# Patient Record
Sex: MALE | Race: WHITE | NOT HISPANIC OR LATINO
[De-identification: names, ages, dates, MRNs, and addresses within clinical notes are randomized per-mention and may not be internally consistent; named-entity substitution may affect disease eponyms.]

---

## 2017-02-21 ENCOUNTER — TRANSCRIPTION ENCOUNTER (OUTPATIENT)
Age: 77
End: 2017-02-21

## 2017-07-10 ENCOUNTER — APPOINTMENT (OUTPATIENT)
Dept: ORTHOPEDIC SURGERY | Facility: CLINIC | Age: 77
End: 2017-07-10

## 2017-07-10 VITALS — SYSTOLIC BLOOD PRESSURE: 145 MMHG | HEART RATE: 80 BPM | DIASTOLIC BLOOD PRESSURE: 77 MMHG

## 2017-07-10 VITALS — HEIGHT: 72 IN | WEIGHT: 232 LBS | BODY MASS INDEX: 31.42 KG/M2

## 2017-07-10 DIAGNOSIS — Z78.9 OTHER SPECIFIED HEALTH STATUS: ICD-10-CM

## 2017-07-10 DIAGNOSIS — Z86.39 PERSONAL HISTORY OF OTHER ENDOCRINE, NUTRITIONAL AND METABOLIC DISEASE: ICD-10-CM

## 2017-07-10 DIAGNOSIS — Z60.2 PROBLEMS RELATED TO LIVING ALONE: ICD-10-CM

## 2017-07-10 DIAGNOSIS — M25.531 PAIN IN RIGHT WRIST: ICD-10-CM

## 2017-07-10 DIAGNOSIS — M25.532 PAIN IN RIGHT WRIST: ICD-10-CM

## 2017-07-10 SDOH — SOCIAL STABILITY - SOCIAL INSECURITY: PROBLEMS RELATED TO LIVING ALONE: Z60.2

## 2018-07-25 ENCOUNTER — OUTPATIENT (OUTPATIENT)
Dept: OUTPATIENT SERVICES | Facility: HOSPITAL | Age: 78
LOS: 1 days | End: 2018-07-25
Payer: MEDICARE

## 2018-07-25 ENCOUNTER — TRANSCRIPTION ENCOUNTER (OUTPATIENT)
Age: 78
End: 2018-07-25

## 2018-07-25 VITALS
HEIGHT: 72 IN | SYSTOLIC BLOOD PRESSURE: 178 MMHG | OXYGEN SATURATION: 95 % | RESPIRATION RATE: 20 BRPM | DIASTOLIC BLOOD PRESSURE: 81 MMHG | HEART RATE: 80 BPM | TEMPERATURE: 100 F | WEIGHT: 240.08 LBS

## 2018-07-25 DIAGNOSIS — Z98.89 OTHER SPECIFIED POSTPROCEDURAL STATES: Chronic | ICD-10-CM

## 2018-07-25 DIAGNOSIS — I20.0 UNSTABLE ANGINA: ICD-10-CM

## 2018-07-25 LAB
ALBUMIN SERPL ELPH-MCNC: 4.2 G/DL — SIGNIFICANT CHANGE UP (ref 3.3–5)
ALP SERPL-CCNC: 84 U/L — SIGNIFICANT CHANGE UP (ref 40–120)
ALT FLD-CCNC: 37 U/L — SIGNIFICANT CHANGE UP (ref 10–45)
ANION GAP SERPL CALC-SCNC: 15 MMOL/L — SIGNIFICANT CHANGE UP (ref 5–17)
AST SERPL-CCNC: 32 U/L — SIGNIFICANT CHANGE UP (ref 10–40)
BILIRUB SERPL-MCNC: 0.5 MG/DL — SIGNIFICANT CHANGE UP (ref 0.2–1.2)
BUN SERPL-MCNC: 23 MG/DL — SIGNIFICANT CHANGE UP (ref 7–23)
CALCIUM SERPL-MCNC: 9.8 MG/DL — SIGNIFICANT CHANGE UP (ref 8.4–10.5)
CHLORIDE SERPL-SCNC: 100 MMOL/L — SIGNIFICANT CHANGE UP (ref 96–108)
CO2 SERPL-SCNC: 23 MMOL/L — SIGNIFICANT CHANGE UP (ref 22–31)
CREAT SERPL-MCNC: 0.98 MG/DL — SIGNIFICANT CHANGE UP (ref 0.5–1.3)
GLUCOSE SERPL-MCNC: 187 MG/DL — HIGH (ref 70–99)
HCT VFR BLD CALC: 44.8 % — SIGNIFICANT CHANGE UP (ref 39–50)
HGB BLD-MCNC: 14.1 G/DL — SIGNIFICANT CHANGE UP (ref 13–17)
MCHC RBC-ENTMCNC: 27.7 PG — SIGNIFICANT CHANGE UP (ref 27–34)
MCHC RBC-ENTMCNC: 31.6 GM/DL — LOW (ref 32–36)
MCV RBC AUTO: 87.6 FL — SIGNIFICANT CHANGE UP (ref 80–100)
PLATELET # BLD AUTO: 235 K/UL — SIGNIFICANT CHANGE UP (ref 150–400)
POTASSIUM SERPL-MCNC: 4.1 MMOL/L — SIGNIFICANT CHANGE UP (ref 3.5–5.3)
POTASSIUM SERPL-SCNC: 4.1 MMOL/L — SIGNIFICANT CHANGE UP (ref 3.5–5.3)
PROT SERPL-MCNC: 7.8 G/DL — SIGNIFICANT CHANGE UP (ref 6–8.3)
RBC # BLD: 5.11 M/UL — SIGNIFICANT CHANGE UP (ref 4.2–5.8)
RBC # FLD: 13.6 % — SIGNIFICANT CHANGE UP (ref 10.3–14.5)
SODIUM SERPL-SCNC: 138 MMOL/L — SIGNIFICANT CHANGE UP (ref 135–145)
WBC # BLD: 7.4 K/UL — SIGNIFICANT CHANGE UP (ref 3.8–10.5)
WBC # FLD AUTO: 7.4 K/UL — SIGNIFICANT CHANGE UP (ref 3.8–10.5)

## 2018-07-25 PROCEDURE — 93458 L HRT ARTERY/VENTRICLE ANGIO: CPT | Mod: 26

## 2018-07-25 PROCEDURE — 99152 MOD SED SAME PHYS/QHP 5/>YRS: CPT

## 2018-07-25 PROCEDURE — 85027 COMPLETE CBC AUTOMATED: CPT

## 2018-07-25 PROCEDURE — C1887: CPT

## 2018-07-25 PROCEDURE — 93010 ELECTROCARDIOGRAM REPORT: CPT

## 2018-07-25 PROCEDURE — 93005 ELECTROCARDIOGRAM TRACING: CPT

## 2018-07-25 PROCEDURE — 80053 COMPREHEN METABOLIC PANEL: CPT

## 2018-07-25 PROCEDURE — 93458 L HRT ARTERY/VENTRICLE ANGIO: CPT

## 2018-07-25 PROCEDURE — C1769: CPT

## 2018-07-25 PROCEDURE — C1894: CPT

## 2018-07-25 RX ORDER — ALLOPURINOL 300 MG
1 TABLET ORAL
Qty: 0 | Refills: 0 | COMMUNITY

## 2018-07-25 RX ORDER — ATORVASTATIN CALCIUM 80 MG/1
1 TABLET, FILM COATED ORAL
Qty: 30 | Refills: 0 | OUTPATIENT
Start: 2018-07-25 | End: 2018-08-23

## 2018-07-25 RX ORDER — SITAGLIPTIN 50 MG/1
1 TABLET, FILM COATED ORAL
Qty: 0 | Refills: 0 | COMMUNITY

## 2018-07-25 RX ORDER — SIMVASTATIN 20 MG/1
1 TABLET, FILM COATED ORAL
Qty: 0 | Refills: 0 | COMMUNITY

## 2018-07-25 RX ORDER — FINASTERIDE 5 MG/1
1 TABLET, FILM COATED ORAL
Qty: 0 | Refills: 0 | COMMUNITY

## 2018-07-25 RX ORDER — SODIUM CHLORIDE 9 MG/ML
3 INJECTION INTRAMUSCULAR; INTRAVENOUS; SUBCUTANEOUS EVERY 8 HOURS
Qty: 0 | Refills: 0 | Status: DISCONTINUED | OUTPATIENT
Start: 2018-07-25 | End: 2018-08-09

## 2018-07-25 RX ORDER — METFORMIN HYDROCHLORIDE 850 MG/1
1 TABLET ORAL
Qty: 0 | Refills: 0 | COMMUNITY

## 2018-07-25 NOTE — H&P CARDIOLOGY - HISTORY OF PRESENT ILLNESS
78 y/o male with PMH of HTN, HLD, DM T2, acid reflux, family h/o CAD ( brother 72 living- CAD - PCI), p/w c/o intermittent indigestion, belching, pain in the left arm, denies chest pain, palpitations, & syncope, seen & evaluated by Dr BRENNEN Cobos & now recommends for cardiac cath.

## 2018-07-30 PROBLEM — M10.9 GOUT, UNSPECIFIED: Chronic | Status: ACTIVE | Noted: 2018-07-25

## 2018-07-30 PROBLEM — E78.5 HYPERLIPIDEMIA, UNSPECIFIED: Chronic | Status: ACTIVE | Noted: 2018-07-25

## 2018-07-30 PROBLEM — E11.9 TYPE 2 DIABETES MELLITUS WITHOUT COMPLICATIONS: Chronic | Status: ACTIVE | Noted: 2018-07-25

## 2018-07-30 PROBLEM — N40.0 BENIGN PROSTATIC HYPERPLASIA WITHOUT LOWER URINARY TRACT SYMPTOMS: Chronic | Status: ACTIVE | Noted: 2018-07-25

## 2018-07-30 PROBLEM — K21.9 GASTRO-ESOPHAGEAL REFLUX DISEASE WITHOUT ESOPHAGITIS: Chronic | Status: ACTIVE | Noted: 2018-07-25

## 2018-07-30 PROBLEM — E66.9 OBESITY, UNSPECIFIED: Chronic | Status: ACTIVE | Noted: 2018-07-25

## 2018-07-30 PROBLEM — I10 ESSENTIAL (PRIMARY) HYPERTENSION: Chronic | Status: ACTIVE | Noted: 2018-07-25

## 2018-08-06 ENCOUNTER — APPOINTMENT (OUTPATIENT)
Dept: GASTROENTEROLOGY | Facility: CLINIC | Age: 78
End: 2018-08-06
Payer: MEDICARE

## 2018-08-06 VITALS
BODY MASS INDEX: 32.51 KG/M2 | SYSTOLIC BLOOD PRESSURE: 124 MMHG | WEIGHT: 240 LBS | OXYGEN SATURATION: 95 % | HEIGHT: 72 IN | TEMPERATURE: 98.7 F | DIASTOLIC BLOOD PRESSURE: 82 MMHG | HEART RATE: 77 BPM

## 2018-08-06 PROCEDURE — 99204 OFFICE O/P NEW MOD 45 MIN: CPT

## 2018-08-07 LAB
ALBUMIN SERPL ELPH-MCNC: 4 G/DL
ALP BLD-CCNC: 88 U/L
ALT SERPL-CCNC: 39 U/L
AMYLASE/CREAT SERPL: 48 U/L
ANION GAP SERPL CALC-SCNC: 17 MMOL/L
AST SERPL-CCNC: 30 U/L
BASOPHILS # BLD AUTO: 0.05 K/UL
BASOPHILS NFR BLD AUTO: 0.7 %
BILIRUB SERPL-MCNC: 0.4 MG/DL
BUN SERPL-MCNC: 24 MG/DL
CALCIUM SERPL-MCNC: 10.1 MG/DL
CHLORIDE SERPL-SCNC: 98 MMOL/L
CO2 SERPL-SCNC: 25 MMOL/L
CREAT SERPL-MCNC: 0.86 MG/DL
EOSINOPHIL # BLD AUTO: 0.38 K/UL
EOSINOPHIL NFR BLD AUTO: 5.1 %
GLUCOSE SERPL-MCNC: 180 MG/DL
HCT VFR BLD CALC: 45.2 %
HGB BLD-MCNC: 14.2 G/DL
IMM GRANULOCYTES NFR BLD AUTO: 0.8 %
LPL SERPL-CCNC: 34 U/L
LYMPHOCYTES # BLD AUTO: 1.5 K/UL
LYMPHOCYTES NFR BLD AUTO: 20.3 %
MAN DIFF?: NORMAL
MCHC RBC-ENTMCNC: 28.3 PG
MCHC RBC-ENTMCNC: 31.4 GM/DL
MCV RBC AUTO: 90.2 FL
MONOCYTES # BLD AUTO: 0.51 K/UL
MONOCYTES NFR BLD AUTO: 6.9 %
NEUTROPHILS # BLD AUTO: 4.89 K/UL
NEUTROPHILS NFR BLD AUTO: 66.2 %
PLATELET # BLD AUTO: 244 K/UL
POTASSIUM SERPL-SCNC: 4.4 MMOL/L
PROT SERPL-MCNC: 7.5 G/DL
RBC # BLD: 5.01 M/UL
RBC # FLD: 15.2 %
SODIUM SERPL-SCNC: 140 MMOL/L
WBC # FLD AUTO: 7.39 K/UL

## 2018-08-21 ENCOUNTER — FORM ENCOUNTER (OUTPATIENT)
Age: 78
End: 2018-08-21

## 2018-08-22 ENCOUNTER — OUTPATIENT (OUTPATIENT)
Dept: OUTPATIENT SERVICES | Facility: HOSPITAL | Age: 78
LOS: 1 days | End: 2018-08-22
Payer: MEDICARE

## 2018-08-22 ENCOUNTER — APPOINTMENT (OUTPATIENT)
Dept: ULTRASOUND IMAGING | Facility: CLINIC | Age: 78
End: 2018-08-22

## 2018-08-22 DIAGNOSIS — Z00.8 ENCOUNTER FOR OTHER GENERAL EXAMINATION: ICD-10-CM

## 2018-08-22 DIAGNOSIS — Z98.89 OTHER SPECIFIED POSTPROCEDURAL STATES: Chronic | ICD-10-CM

## 2018-08-22 PROCEDURE — 76700 US EXAM ABDOM COMPLETE: CPT | Mod: 26

## 2018-08-22 PROCEDURE — 76700 US EXAM ABDOM COMPLETE: CPT

## 2018-09-05 ENCOUNTER — TRANSCRIPTION ENCOUNTER (OUTPATIENT)
Age: 78
End: 2018-09-05

## 2018-09-06 ENCOUNTER — OUTPATIENT (OUTPATIENT)
Dept: OUTPATIENT SERVICES | Facility: HOSPITAL | Age: 78
LOS: 1 days | End: 2018-09-06
Payer: MEDICARE

## 2018-09-06 ENCOUNTER — RESULT REVIEW (OUTPATIENT)
Age: 78
End: 2018-09-06

## 2018-09-06 ENCOUNTER — APPOINTMENT (OUTPATIENT)
Dept: GASTROENTEROLOGY | Facility: HOSPITAL | Age: 78
End: 2018-09-06

## 2018-09-06 DIAGNOSIS — Z98.89 OTHER SPECIFIED POSTPROCEDURAL STATES: Chronic | ICD-10-CM

## 2018-09-06 DIAGNOSIS — R10.13 EPIGASTRIC PAIN: ICD-10-CM

## 2018-09-06 PROCEDURE — 88313 SPECIAL STAINS GROUP 2: CPT | Mod: 26

## 2018-09-06 PROCEDURE — 88312 SPECIAL STAINS GROUP 1: CPT

## 2018-09-06 PROCEDURE — 88305 TISSUE EXAM BY PATHOLOGIST: CPT | Mod: 26

## 2018-09-06 PROCEDURE — 88313 SPECIAL STAINS GROUP 2: CPT

## 2018-09-06 PROCEDURE — 88305 TISSUE EXAM BY PATHOLOGIST: CPT

## 2018-09-06 PROCEDURE — 88312 SPECIAL STAINS GROUP 1: CPT | Mod: 26

## 2018-09-06 PROCEDURE — 43239 EGD BIOPSY SINGLE/MULTIPLE: CPT

## 2018-09-06 PROCEDURE — 88342 IMHCHEM/IMCYTCHM 1ST ANTB: CPT | Mod: 26

## 2018-09-06 PROCEDURE — 88342 IMHCHEM/IMCYTCHM 1ST ANTB: CPT

## 2018-09-27 ENCOUNTER — APPOINTMENT (OUTPATIENT)
Dept: BARIATRICS | Facility: CLINIC | Age: 78
End: 2018-09-27
Payer: MEDICARE

## 2018-09-27 VITALS
DIASTOLIC BLOOD PRESSURE: 70 MMHG | BODY MASS INDEX: 33.06 KG/M2 | HEART RATE: 77 BPM | OXYGEN SATURATION: 97 % | WEIGHT: 244.05 LBS | SYSTOLIC BLOOD PRESSURE: 124 MMHG | HEIGHT: 72 IN

## 2018-09-27 PROCEDURE — 99203 OFFICE O/P NEW LOW 30 MIN: CPT

## 2019-01-23 ENCOUNTER — APPOINTMENT (OUTPATIENT)
Dept: ORTHOPEDIC SURGERY | Facility: CLINIC | Age: 79
End: 2019-01-23
Payer: MEDICARE

## 2019-01-23 VITALS
WEIGHT: 242 LBS | BODY MASS INDEX: 32.78 KG/M2 | HEART RATE: 86 BPM | HEIGHT: 72 IN | SYSTOLIC BLOOD PRESSURE: 136 MMHG | DIASTOLIC BLOOD PRESSURE: 80 MMHG

## 2019-01-23 DIAGNOSIS — M21.41 FLAT FOOT [PES PLANUS] (ACQUIRED), RIGHT FOOT: ICD-10-CM

## 2019-01-23 DIAGNOSIS — M25.871 OTHER SPECIFIED JOINT DISORDERS, RIGHT ANKLE AND FOOT: ICD-10-CM

## 2019-01-23 DIAGNOSIS — M21.6X1 OTHER ACQUIRED DEFORMITIES OF RIGHT FOOT: ICD-10-CM

## 2019-01-23 DIAGNOSIS — M76.821 POSTERIOR TIBIAL TENDINITIS, RIGHT LEG: ICD-10-CM

## 2019-01-23 DIAGNOSIS — M94.279 CHONDROMALACIA, UNSPECIFIED ANKLE AND JOINTS OF FOOT: ICD-10-CM

## 2019-01-23 DIAGNOSIS — M25.571 PAIN IN RIGHT ANKLE AND JOINTS OF RIGHT FOOT: ICD-10-CM

## 2019-01-23 PROCEDURE — 73620 X-RAY EXAM OF FOOT: CPT | Mod: RT

## 2019-01-23 PROCEDURE — 73610 X-RAY EXAM OF ANKLE: CPT | Mod: RT

## 2019-01-23 PROCEDURE — 99214 OFFICE O/P EST MOD 30 MIN: CPT

## 2019-01-23 NOTE — DISCUSSION/SUMMARY
[de-identified] : Discussed with patient nature of condition, potential course / sequelae, options reviewed.  Cam boot immobilization and Cam boot ambulation, activity modification, calf stretching exercises and HEP.  Educational handouts provided.  Return to office in 6 - 8 weeks / PRN, all questions answered.

## 2019-01-23 NOTE — HISTORY OF PRESENT ILLNESS
[Other: ____] : [unfilled] [FreeTextEntry1] : 78 year male presents for evaluation of R ankle pain x summer 2018. Pt denies any recent injuries to R foot/ankle. Pt states the pain is on medial aspect of R foot/ankle. Pt states the pain is intermittent and currently have no pain today. Pt takes Aleve for pain prn. Pt denies any numbness/tingling/limping. Pt had seen podiatrist who dx him with arthritis. Pt denies any previous treatments for R foot/ankle such as PT/injections. Pt denies any previous injuries/fx to R foot/ankle. Denies additional musculoskeletal complaints referable to foot/ankle.\par

## 2019-01-23 NOTE — PHYSICAL EXAM
[de-identified] : Extremity: +Equinus (releases) R LE, +PPAV R foot, residual weakness with R SLHR testing, soft tissue swelling and associated tenderness PTT insertional R foot, mild tenderness anteromedial aspect R ankle.  Nontender R distal fibula, peroneals, syndesmosis, Achilles, hindfoot ST, midfoot LF and forefoot.  Stable Drawer testing R ankle, 5 / 5 evertor strength R ankle, calves soft and nontender, sensorimotor unchanged, skin intact B LE.  AOx3, mood / affect normal. [de-identified] : Radiographs (3v R ankle and 2v R foot) reveal PTS / Huynh's R foot, footballer's R ankle, dorsal spurring midfoot, posterior calcaneal enthesophyte R hindfoot, HR R forefoot.

## 2019-04-24 ENCOUNTER — APPOINTMENT (OUTPATIENT)
Dept: ORTHOPEDIC SURGERY | Facility: CLINIC | Age: 79
End: 2019-04-24
Payer: MEDICARE

## 2019-04-24 VITALS
WEIGHT: 234 LBS | SYSTOLIC BLOOD PRESSURE: 128 MMHG | HEART RATE: 83 BPM | BODY MASS INDEX: 31.69 KG/M2 | DIASTOLIC BLOOD PRESSURE: 75 MMHG | HEIGHT: 72 IN

## 2019-04-24 DIAGNOSIS — M47.812 SPONDYLOSIS W/OUT MYELOPATHY OR RADICULOPATHY, CERVICAL REGION: ICD-10-CM

## 2019-04-24 PROCEDURE — 20610 DRAIN/INJ JOINT/BURSA W/O US: CPT

## 2019-04-24 PROCEDURE — 99214 OFFICE O/P EST MOD 30 MIN: CPT | Mod: 25

## 2019-04-24 PROCEDURE — 72040 X-RAY EXAM NECK SPINE 2-3 VW: CPT

## 2019-04-24 PROCEDURE — 73030 X-RAY EXAM OF SHOULDER: CPT

## 2019-07-07 PROBLEM — M21.41 ACQUIRED RIGHT FLAT FOOT: Status: ACTIVE | Noted: 2019-01-23

## 2019-08-15 ENCOUNTER — NON-APPOINTMENT (OUTPATIENT)
Age: 79
End: 2019-08-15

## 2019-08-15 ENCOUNTER — APPOINTMENT (OUTPATIENT)
Dept: INTERNAL MEDICINE | Facility: CLINIC | Age: 79
End: 2019-08-15
Payer: MEDICARE

## 2019-08-15 VITALS
DIASTOLIC BLOOD PRESSURE: 70 MMHG | HEART RATE: 76 BPM | RESPIRATION RATE: 16 BRPM | BODY MASS INDEX: 32.59 KG/M2 | HEIGHT: 71.5 IN | TEMPERATURE: 98.6 F | SYSTOLIC BLOOD PRESSURE: 125 MMHG | WEIGHT: 238 LBS

## 2019-08-15 DIAGNOSIS — M10.9 GOUT, UNSPECIFIED: ICD-10-CM

## 2019-08-15 DIAGNOSIS — H90.5 UNSPECIFIED SENSORINEURAL HEARING LOSS: ICD-10-CM

## 2019-08-15 DIAGNOSIS — Z84.89 FAMILY HISTORY OF OTHER SPECIFIED CONDITIONS: ICD-10-CM

## 2019-08-15 PROCEDURE — 93000 ELECTROCARDIOGRAM COMPLETE: CPT | Mod: 59

## 2019-08-15 PROCEDURE — G0444 DEPRESSION SCREEN ANNUAL: CPT | Mod: 59

## 2019-08-15 PROCEDURE — 99497 ADVNCD CARE PLAN 30 MIN: CPT | Mod: 33

## 2019-08-15 PROCEDURE — 99203 OFFICE O/P NEW LOW 30 MIN: CPT | Mod: 25

## 2019-08-15 PROCEDURE — G0439: CPT

## 2019-08-15 RX ORDER — MELOXICAM 15 MG/1
15 TABLET ORAL DAILY
Qty: 30 | Refills: 1 | Status: DISCONTINUED | COMMUNITY
Start: 2017-07-10 | End: 2019-08-15

## 2019-08-15 RX ORDER — EMPAGLIFLOZIN 10 MG/1
10 TABLET, FILM COATED ORAL
Refills: 5 | Status: ACTIVE | COMMUNITY
Start: 2019-08-15

## 2019-08-15 NOTE — PHYSICAL EXAM
[No Acute Distress] : no acute distress [Well Developed] : well developed [Well Nourished] : well nourished [Normal Sclera/Conjunctiva] : normal sclera/conjunctiva [Well-Appearing] : well-appearing [Normal Outer Ear/Nose] : the outer ears and nose were normal in appearance [EOMI] : extraocular movements intact [PERRL] : pupils equal round and reactive to light [Normal Oropharynx] : the oropharynx was normal [Normal TMs] : both tympanic membranes were normal [No Lymphadenopathy] : no lymphadenopathy [No JVD] : no jugular venous distention [Supple] : supple [Thyroid Normal, No Nodules] : the thyroid was normal and there were no nodules present [No Accessory Muscle Use] : no accessory muscle use [No Respiratory Distress] : no respiratory distress  [Clear to Auscultation] : lungs were clear to auscultation bilaterally [Normal Rate] : normal rate  [Regular Rhythm] : with a regular rhythm [No Murmur] : no murmur heard [Normal S1, S2] : normal S1 and S2 [No Carotid Bruits] : no carotid bruits [No Abdominal Bruit] : a ~M bruit was not heard ~T in the abdomen [No Varicosities] : no varicosities [Pedal Pulses Present] : the pedal pulses are present [No Edema] : there was no peripheral edema [No Palpable Aorta] : no palpable aorta [No Extremity Clubbing/Cyanosis] : no extremity clubbing/cyanosis [Soft] : abdomen soft [Non Tender] : non-tender [Non-distended] : non-distended [No HSM] : no HSM [No Masses] : no abdominal mass palpated [Normal Bowel Sounds] : normal bowel sounds [No Hernias] : no hernias [Penis Abnormality] : normal circumcised penis [Normal Supraclavicular Nodes] : no supraclavicular lymphadenopathy [Testes Tenderness] : no tenderness of the testes [Normal Posterior Cervical Nodes] : no posterior cervical lymphadenopathy [Normal Axillary Nodes] : no axillary lymphadenopathy [Normal Inguinal Nodes] : no inguinal lymphadenopathy [Normal Anterior Cervical Nodes] : no anterior cervical lymphadenopathy [No CVA Tenderness] : no CVA  tenderness [No Spinal Tenderness] : no spinal tenderness [No Joint Swelling] : no joint swelling [Grossly Normal Strength/Tone] : grossly normal strength/tone [No Rash] : no rash [No Focal Deficits] : no focal deficits [Coordination Grossly Intact] : coordination grossly intact [Normal Gait] : normal gait [Normal Affect] : the affect was normal [Deep Tendon Reflexes (DTR)] : deep tendon reflexes were 2+ and symmetric [Normal Insight/Judgement] : insight and judgment were intact [Normal] : affect was normal and insight and judgment were intact [Comprehensive Foot Exam Normal] : Right and left foot were examined and both feet are normal. No ulcers in either foot. Toes are normal and with full ROM.  Normal tactile sensation with monofilament testing throughout both feet [FreeTextEntry1] : Deferred to

## 2019-08-15 NOTE — REVIEW OF SYSTEMS
[Hearing Loss] : hearing loss [Nocturia] : nocturia [Negative] : Heme/Lymph [FreeTextEntry8] : nocturia x 1 [FreeTextEntry9] : KATIANA [de-identified] : sees derm

## 2019-08-15 NOTE — HISTORY OF PRESENT ILLNESS
[FreeTextEntry1] : Well visit and follow up for management of:\par Diabetes  -on meds\par Hyperlipidemia - on meds\par BPH - on meds\par

## 2019-08-15 NOTE — HEALTH RISK ASSESSMENT
[Fair] :  ~his/her~ mood as fair [] : No [No] : No [2 - 4 times a month (2 pts)] : 2-4 times a month (2 points) [1 or 2 (0 pts)] : 1 or 2 (0 points) [Never (0 pts)] : Never (0 points) [No falls in past year] : Patient reported no falls in the past year [de-identified] : Walking - daily [de-identified] : Low sugars, does not add salt [Change in mental status noted] : No change in mental status noted [None] : None [With Significant Other] : lives with significant other [Employed] : employed [] :  [Feels Safe at Home] : Feels safe at home [Fully functional (bathing, dressing, toileting, transferring, walking, feeding)] : Fully functional (bathing, dressing, toileting, transferring, walking, feeding) [Fully functional (using the telephone, shopping, preparing meals, housekeeping, doing laundry, using] : Fully functional and needs no help or supervision to perform IADLs (using the telephone, shopping, preparing meals, housekeeping, doing laundry, using transportation, managing medications and managing finances) [Smoke Detector] : smoke detector [Carbon Monoxide Detector] : carbon monoxide detector [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

## 2019-09-20 ENCOUNTER — APPOINTMENT (OUTPATIENT)
Dept: GASTROENTEROLOGY | Facility: CLINIC | Age: 79
End: 2019-09-20
Payer: MEDICARE

## 2019-09-20 VITALS
DIASTOLIC BLOOD PRESSURE: 89 MMHG | SYSTOLIC BLOOD PRESSURE: 103 MMHG | OXYGEN SATURATION: 98 % | RESPIRATION RATE: 14 BRPM | WEIGHT: 238 LBS | HEIGHT: 71 IN | HEART RATE: 88 BPM | BODY MASS INDEX: 33.32 KG/M2

## 2019-09-20 DIAGNOSIS — K63.5 POLYP OF COLON: ICD-10-CM

## 2019-09-20 DIAGNOSIS — Z12.12 ENCOUNTER FOR SCREENING FOR MALIGNANT NEOPLASM OF COLON: ICD-10-CM

## 2019-09-20 DIAGNOSIS — Z12.11 ENCOUNTER FOR SCREENING FOR MALIGNANT NEOPLASM OF COLON: ICD-10-CM

## 2019-09-20 PROCEDURE — 99214 OFFICE O/P EST MOD 30 MIN: CPT

## 2019-09-20 NOTE — ASSESSMENT
[FreeTextEntry1] : 77 y/o man with Hx of HTN, hyperlipidemia, DM, gallstone, GERD, referred for a screening colonoscopy.   Patient reports having mild left lower quadrant discomfort - he believes it maybe arthritis from his hip.  Abdominal exam benign.  \par \par I had a long discussion with the patient with regards to a colonoscopy to rule out colon polyps, colon lesion, colitis etc. under monitored anesthesia care. The risks of the procedure including perforation requiring surgery, bleeding, colitis, etc and risks of anesthesia etc. were discussed with the patient. He would like me to have a conversation with his daughter who works for the health system first.   Alternatives to a colonoscopy including a CT colonography was discussed with the patient.   Again he is not ready to make a decision, he would like for me to have a conversation with his daughter Estelita (Cell number [phone])\par \par I called Estelita on her cell and had a long discussion on the indications for a screening colonoscopy to rule out colon polyps, colon lesion etc. under monitored anesthesia care.   We reviewed risk factors for colon cancer.  Risks of the procedure was discussed with the patient's daughter. Alternatives such as CT colonoscopy was discussed as well - limitations of the CT scan was also reviewed. His only symptom currently is left lower quadrant pain that's mild and vague. His abdominal exam is benign. She would like to wait and see within the next few weeks if symptoms change/progress. If they worsen she would like CT colonography first. She will have the patient call us. If the symptoms resolve she does not want further screening tests.  \par \par I will have the staff get records from Dr. Wilde's office. After receiving the records they will be reviewed with the patient and his daughter.\par \par \par

## 2019-09-20 NOTE — PHYSICAL EXAM
[General Appearance - In No Acute Distress] : in no acute distress [General Appearance - Alert] : alert [Sclera] : the sclera and conjunctiva were normal [Outer Ear] : the ears and nose were normal in appearance [Extraocular Movements] : extraocular movements were intact [Neck Appearance] : the appearance of the neck was normal [Hearing Threshold Finger Rub Not [place]] : hearing was normal [Neck Cervical Mass (___cm)] : no neck mass was observed [Heart Sounds] : normal S1 and S2 [Heart Rate And Rhythm] : heart rate was normal and rhythm regular [Auscultation Breath Sounds / Voice Sounds] : lungs were clear to auscultation bilaterally [Heart Sounds Gallop] : no gallops [Murmurs] : no murmurs [Bowel Sounds] : normal bowel sounds [Heart Sounds Pericardial Friction Rub] : no pericardial rub [Abdomen Soft] : soft [Abdomen Tenderness] : non-tender [Cervical Lymph Nodes Enlarged Posterior Bilaterally] : posterior cervical [Abdomen Mass (___ Cm)] : no abdominal mass palpated [Cervical Lymph Nodes Enlarged Anterior Bilaterally] : anterior cervical [Supraclavicular Lymph Nodes Enlarged Bilaterally] : supraclavicular [Abnormal Walk] : normal gait [Skin Color & Pigmentation] : normal skin color and pigmentation [Nail Clubbing] : no clubbing  or cyanosis of the fingernails [Oriented To Time, Place, And Person] : oriented to person, place, and time [] : no rash [Impaired Insight] : insight and judgment were intact [Affect] : the affect was normal

## 2019-09-20 NOTE — HISTORY OF PRESENT ILLNESS
[de-identified] : 79 y/o man with Hx of HTN, hyperlipidemia, DM, gallstone, GERD, referred for a colonoscopy. \par \par Reports having a colonoscopy 6 or 7 years ago and a polyp was removed.  His PCP would like another colonoscopy.  \par \par Patient reports having a discomfort in his left lower quadrant area since the past 2 weeks. He says it's a very mild discomfort. Intermittent. He says when he stands up he feels better.\par \par At times he belches.  He offers no other complaints. \par \par Pt denies having  heartburn, dysphagia, odynophagia, nausea, vomiting, fevers, chills, jaundice, loss of appetite, wt loss, constipation, diarrhea, hematochezia and melena.\par \par On September 2018 the patient underwent an upper endoscopy and was found to have irregular Z line status post biopsy, in the antrum near the pylorus there was a small area where the mucosa appeared heaped up with a central mucous plug that could not be washed off questionable ulcer status post biopsy. Mild antral and body erythema status post biopsies, normal duodenum status post biopsy.\par \par Biopsy of a questionable ulcer revealed active chronic antral gastritis moderate with reactive gastropathy negative for H. pylori bacteria. As per the pathologist these changes may be adjacent to gastric ulcers. Negative for malignancy.\par \par GE junction biopsies revealed chronic active inflammation moderate with scattered eosinophils consistent with reflux changes negative for intestinal metaplasia and dysplasia.\par \par Seen by Surgeon for possible lap anthony of Gallstone measuring 1 cm - stone not felt to be the cause of his GI complaints. \par \par \par \par \par \par From Aug 2018 visit: \par He says since the past 6 weeks he has been having indigestion. He describes having an annoying discomfort in epigastric area with where it "builds itself up", where he will then belch - this occurs once or a few times a day. Denies heartburn, or burning in chest. When he eats fast he will have worsening indigestion. \par \par Has not taken antacids. \par \par Advil or Aleve, twice a month, when knees start to hurt. \par \par He says he would not have brought these complaints to the attention of his doctors if his brother did not have a heart attack. He himself was recently evaluated by his cardiologist and underwent an angiogram that was unremarkable - reports he did not have to have a stent placed. His cardiologist advised him to see GI for his present complaints. \par \par Pt denies having dysphagia, odynophagia, nausea, vomiting, fevers, chills, jaundice, loss of appetite, wt loss, constipation, diarrhea, melena and bright red blood per rectum.\par \par Never had EGD. \par \par Last colonoscopy was 6 years ago, a polyp was removed and was told by his GI he did not need another one. \par \par No GI cancers in family.

## 2019-10-02 PROBLEM — Z60.2 PERSON LIVING ALONE: Status: ACTIVE | Noted: 2017-07-10

## 2019-10-15 ENCOUNTER — FORM ENCOUNTER (OUTPATIENT)
Age: 79
End: 2019-10-15

## 2019-10-16 ENCOUNTER — OUTPATIENT (OUTPATIENT)
Dept: OUTPATIENT SERVICES | Facility: HOSPITAL | Age: 79
LOS: 1 days | End: 2019-10-16
Payer: MEDICARE

## 2019-10-16 ENCOUNTER — APPOINTMENT (OUTPATIENT)
Dept: CT IMAGING | Facility: CLINIC | Age: 79
End: 2019-10-16

## 2019-10-16 DIAGNOSIS — Z98.89 OTHER SPECIFIED POSTPROCEDURAL STATES: Chronic | ICD-10-CM

## 2019-10-16 DIAGNOSIS — Z00.8 ENCOUNTER FOR OTHER GENERAL EXAMINATION: ICD-10-CM

## 2019-10-16 PROCEDURE — 74261 CT COLONOGRAPHY DX: CPT

## 2019-10-16 PROCEDURE — 74261 CT COLONOGRAPHY DX: CPT | Mod: 26

## 2020-08-18 ENCOUNTER — APPOINTMENT (OUTPATIENT)
Dept: INTERNAL MEDICINE | Facility: CLINIC | Age: 80
End: 2020-08-18
Payer: MEDICARE

## 2020-08-18 VITALS
WEIGHT: 229 LBS | HEART RATE: 72 BPM | BODY MASS INDEX: 32.06 KG/M2 | TEMPERATURE: 97.9 F | DIASTOLIC BLOOD PRESSURE: 60 MMHG | RESPIRATION RATE: 16 BRPM | SYSTOLIC BLOOD PRESSURE: 120 MMHG | HEIGHT: 71 IN

## 2020-08-18 DIAGNOSIS — E78.5 HYPERLIPIDEMIA, UNSPECIFIED: ICD-10-CM

## 2020-08-18 DIAGNOSIS — E55.9 VITAMIN D DEFICIENCY, UNSPECIFIED: ICD-10-CM

## 2020-08-18 DIAGNOSIS — Z23 ENCOUNTER FOR IMMUNIZATION: ICD-10-CM

## 2020-08-18 DIAGNOSIS — Z00.00 ENCOUNTER FOR GENERAL ADULT MEDICAL EXAMINATION W/OUT ABNORMAL FINDINGS: ICD-10-CM

## 2020-08-18 DIAGNOSIS — Z82.49 FAMILY HISTORY OF ISCHEMIC HEART DISEASE AND OTHER DISEASES OF THE CIRCULATORY SYSTEM: ICD-10-CM

## 2020-08-18 PROCEDURE — G0444 DEPRESSION SCREEN ANNUAL: CPT | Mod: 59

## 2020-08-18 PROCEDURE — G0439: CPT

## 2020-08-18 PROCEDURE — G0009: CPT

## 2020-08-18 PROCEDURE — 99497 ADVNCD CARE PLAN 30 MIN: CPT | Mod: 33

## 2020-08-18 PROCEDURE — 99213 OFFICE O/P EST LOW 20 MIN: CPT | Mod: 25

## 2020-08-18 PROCEDURE — 90732 PPSV23 VACC 2 YRS+ SUBQ/IM: CPT

## 2020-08-18 RX ORDER — GLIPIZIDE 5 MG/1
5 TABLET, FILM COATED, EXTENDED RELEASE ORAL
Qty: 90 | Refills: 0 | Status: DISCONTINUED | COMMUNITY
Start: 2018-07-16 | End: 2020-08-18

## 2020-08-18 NOTE — ASSESSMENT
[FreeTextEntry1] : Pt with above medical issues which requires extra work in addition to the well visit.\par Labs are done by endocrine and EKG by cardiology\par To have labs sent here.\par Meds to be adjusted.\par Pneumovax given.\par Health counseling given.\par

## 2020-08-18 NOTE — HISTORY OF PRESENT ILLNESS
[FreeTextEntry1] : Well visit and follow up for management of:\par Hyperlipidemia - on meds\par Diabetes - on meds\par Hypertension - on meds

## 2020-08-18 NOTE — PHYSICAL EXAM
[Well Nourished] : well nourished [No Acute Distress] : no acute distress [Well-Appearing] : well-appearing [Well Developed] : well developed [Normal Sclera/Conjunctiva] : normal sclera/conjunctiva [PERRL] : pupils equal round and reactive to light [EOMI] : extraocular movements intact [Normal Outer Ear/Nose] : the outer ears and nose were normal in appearance [Normal Oropharynx] : the oropharynx was normal [Normal TMs] : both tympanic membranes were normal [No JVD] : no jugular venous distention [No Lymphadenopathy] : no lymphadenopathy [Supple] : supple [Thyroid Normal, No Nodules] : the thyroid was normal and there were no nodules present [No Respiratory Distress] : no respiratory distress  [No Accessory Muscle Use] : no accessory muscle use [Clear to Auscultation] : lungs were clear to auscultation bilaterally [Regular Rhythm] : with a regular rhythm [Normal Rate] : normal rate  [Normal S1, S2] : normal S1 and S2 [No Murmur] : no murmur heard [No Carotid Bruits] : no carotid bruits [No Varicosities] : no varicosities [No Abdominal Bruit] : a ~M bruit was not heard ~T in the abdomen [No Edema] : there was no peripheral edema [No Palpable Aorta] : no palpable aorta [Pedal Pulses Present] : the pedal pulses are present [No Extremity Clubbing/Cyanosis] : no extremity clubbing/cyanosis [Non Tender] : non-tender [Non-distended] : non-distended [Soft] : abdomen soft [No Masses] : no abdominal mass palpated [No HSM] : no HSM [Normal Bowel Sounds] : normal bowel sounds [No Hernias] : no hernias [Penis Abnormality] : normal circumcised penis [Testes Tenderness] : no tenderness of the testes [Testes Mass (___cm)] : there were no testicular masses [Normal Supraclavicular Nodes] : no supraclavicular lymphadenopathy [Normal Posterior Cervical Nodes] : no posterior cervical lymphadenopathy [Normal Axillary Nodes] : no axillary lymphadenopathy [Normal Inguinal Nodes] : no inguinal lymphadenopathy [Normal Anterior Cervical Nodes] : no anterior cervical lymphadenopathy [No CVA Tenderness] : no CVA  tenderness [Normal Femoral Nodes] : no femoral lymphadenopathy [No Spinal Tenderness] : no spinal tenderness [No Joint Swelling] : no joint swelling [Grossly Normal Strength/Tone] : grossly normal strength/tone [No Rash] : no rash [Coordination Grossly Intact] : coordination grossly intact [Normal Gait] : normal gait [No Focal Deficits] : no focal deficits [Deep Tendon Reflexes (DTR)] : deep tendon reflexes were 2+ and symmetric [Normal Insight/Judgement] : insight and judgment were intact [Normal Affect] : the affect was normal [Normal] : affect was normal and insight and judgment were intact [Comprehensive Foot Exam Normal] : Right and left foot were examined and both feet are normal. No ulcers in either foot. Toes are normal and with full ROM.  Normal tactile sensation with monofilament testing throughout both feet [FreeTextEntry1] : Deferred to

## 2020-08-18 NOTE — HEALTH RISK ASSESSMENT
[Good] : ~his/her~  mood as  good [No] : No [2 - 4 times a month (2 pts)] : 2-4 times a month (2 points) [Never (0 pts)] : Never (0 points) [1 or 2 (0 pts)] : 1 or 2 (0 points) [No falls in past year] : Patient reported no falls in the past year [No Retinopathy] : No retinopathy [Patient reported colonoscopy was normal] : Patient reported colonoscopy was normal [None] : None [With Significant Other] : lives with significant other [Employed] : employed [] :  [Fully functional (bathing, dressing, toileting, transferring, walking, feeding)] : Fully functional (bathing, dressing, toileting, transferring, walking, feeding) [Feels Safe at Home] : Feels safe at home [Fully functional (using the telephone, shopping, preparing meals, housekeeping, doing laundry, using] : Fully functional and needs no help or supervision to perform IADLs (using the telephone, shopping, preparing meals, housekeeping, doing laundry, using transportation, managing medications and managing finances) [Carbon Monoxide Detector] : carbon monoxide detector [Smoke Detector] : smoke detector [Seat Belt] :  uses seat belt [Sunscreen] : uses sunscreen [With Patient/Caregiver] : With Patient/Caregiver [Designated Healthcare Proxy] : Designated healthcare proxy [Name: ___] : Health Care Proxy's Name: [unfilled]  [] : No [de-identified] : Walking [de-identified] : Low sugar, fat, salt [EyeExamDate] : 11/19 [ColonoscopyDate] : 09/18 [Change in mental status noted] : No change in mental status noted [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

## 2020-08-18 NOTE — REVIEW OF SYSTEMS
[Hearing Loss] : hearing loss [Nocturia] : nocturia [Negative] : Heme/Lymph [FreeTextEntry8] : nocturia x 1

## 2020-09-10 ENCOUNTER — APPOINTMENT (OUTPATIENT)
Dept: INTERNAL MEDICINE | Facility: CLINIC | Age: 80
End: 2020-09-10
Payer: MEDICARE

## 2020-09-10 ENCOUNTER — NON-APPOINTMENT (OUTPATIENT)
Age: 80
End: 2020-09-10

## 2020-09-10 VITALS
DIASTOLIC BLOOD PRESSURE: 60 MMHG | TEMPERATURE: 97.3 F | WEIGHT: 224 LBS | OXYGEN SATURATION: 97 % | HEIGHT: 71 IN | BODY MASS INDEX: 31.36 KG/M2 | HEART RATE: 84 BPM | RESPIRATION RATE: 16 BRPM | SYSTOLIC BLOOD PRESSURE: 120 MMHG

## 2020-09-10 PROCEDURE — 99214 OFFICE O/P EST MOD 30 MIN: CPT | Mod: 25

## 2020-09-10 PROCEDURE — 93000 ELECTROCARDIOGRAM COMPLETE: CPT

## 2020-09-10 RX ORDER — OMEPRAZOLE 40 MG/1
40 CAPSULE, DELAYED RELEASE ORAL
Qty: 30 | Refills: 3 | Status: DISCONTINUED | COMMUNITY
Start: 2018-09-06 | End: 2020-09-10

## 2020-09-10 RX ORDER — TAMSULOSIN HYDROCHLORIDE 0.4 MG/1
0.4 CAPSULE ORAL
Refills: 0 | Status: DISCONTINUED | COMMUNITY
Start: 2019-08-15 | End: 2020-09-10

## 2020-09-10 NOTE — HISTORY OF PRESENT ILLNESS
[No Pertinent Pulmonary History] : no history of asthma, COPD, sleep apnea, or smoking [No Pertinent Cardiac History] : no history of aortic stenosis, atrial fibrillation, coronary artery disease, recent myocardial infarction, or implantable device/pacemaker [Chronic Anticoagulation] : no chronic anticoagulation [No Adverse Anesthesia Reaction] : no adverse anesthesia reaction in self or family member [Chronic Kidney Disease] : no chronic kidney disease [Diabetes] : diabetes [(Patient denies any chest pain, claudication, dyspnea on exertion, orthopnea, palpitations or syncope)] : Patient denies any chest pain, claudication, dyspnea on exertion, orthopnea, palpitations or syncope [FreeTextEntry1] : Left cataract surgery [FreeTextEntry2] : 09/17/2020 [FreeTextEntry3] : Dr. Cagle

## 2020-09-10 NOTE — CONSULT LETTER
[Dear  ___] : Dear  [unfilled], [Please see my note below.] : Please see my note below. [Consult Letter:] : I had the pleasure of evaluating your patient, [unfilled]. [FreeTextEntry1] : Pre-Op Evaluation [Consult Closing:] : Thank you very much for allowing me to participate in the care of this patient.  If you have any questions, please do not hesitate to contact me. [Sincerely,] : Sincerely, [FreeTextEntry3] : Hoang Lynch MD

## 2020-09-10 NOTE — PHYSICAL EXAM
[Normal Sclera/Conjunctiva] : normal sclera/conjunctiva [No JVD] : no jugular venous distention [Supple] : supple [No Lymphadenopathy] : no lymphadenopathy [Normal] : no respiratory distress, lungs were clear to auscultation bilaterally and no accessory muscle use [No Edema] : there was no peripheral edema [Soft] : abdomen soft [Non Tender] : non-tender [No Masses] : no abdominal mass palpated [Non-distended] : non-distended [No HSM] : no HSM [Normal Bowel Sounds] : normal bowel sounds [Normal Supraclavicular Nodes] : no supraclavicular lymphadenopathy [Normal Anterior Cervical Nodes] : no anterior cervical lymphadenopathy [Normal Posterior Cervical Nodes] : no posterior cervical lymphadenopathy [No Focal Deficits] : no focal deficits [Alert and Oriented x3] : oriented to person, place, and time

## 2020-09-10 NOTE — ASSESSMENT
[Patient Optimized for Surgery] : Patient optimized for surgery [No Further Testing Recommended] : no further testing recommended [FreeTextEntry7] : Hold Metformin and Jardiance on AM of surgery. Yake Valsartan with sip of water on AM of surgery. [FreeTextEntry4] : Pt is an acceptable candidate for planned surgery.

## 2020-10-16 ENCOUNTER — APPOINTMENT (OUTPATIENT)
Dept: UROLOGY | Facility: CLINIC | Age: 80
End: 2020-10-16

## 2020-10-21 ENCOUNTER — APPOINTMENT (OUTPATIENT)
Dept: UROLOGY | Facility: CLINIC | Age: 80
End: 2020-10-21
Payer: MEDICARE

## 2020-10-21 VITALS — TEMPERATURE: 97.5 F

## 2020-10-21 PROCEDURE — 99204 OFFICE O/P NEW MOD 45 MIN: CPT | Mod: 25

## 2020-10-21 PROCEDURE — 51798 US URINE CAPACITY MEASURE: CPT

## 2020-10-21 NOTE — ASSESSMENT
[FreeTextEntry1] : Bladder scan- 446 ml \par \par Prostate MRI to scheduled given rise of PSA over baseline since stopping finasteride, though sig BPH and retention can be causative.\par \par Pt without urge at this time- cannot provide urine for culture, but no dysuria at this time.\par \par MRI best to determine risk based on lesions, psa density given h/o negative biopsy and recent sig rise.  Pt agreeable- will proceed.

## 2020-10-21 NOTE — HISTORY OF PRESENT ILLNESS
[Urinary Urgency] : urinary urgency [Intermittency] : intermittency [Post-Void Dribbling] : post-void dribbling [Erectile Dysfunction] : Erectile Dysfunction [None] : None [FreeTextEntry1] : 80 yr old male presents to establish care for elevated PSA. Was seeing Dr. Gorman, who retired and then Dr. Goldberg for BPH but switching care here. Pt was taking Finasteride but stopped when Dr. Walker retired about a year ago. Pt taking Doxazosin 4 mg daily. \par \par Surgical hx: Abd hernia repair  2003, Angiogram 2018, Cataracts 09/2020\par Medical hx:gout, HTN, HLD, DM II, BPH \par Allergies: sulfa- rash, PCN- rash\par Social: Alcohol- occasionally, Smoking-quit 23 yrs ago, 1.5 ppd for 20 years, Drug- none, Occupation- retired  \par Family hx: none\par \par PSA \par 7.38- 07/2020\par 1.51- 12/2019\par 1.740- 12/2018\par 1.5- 02/2017\par 1.79- 07/2016\par 1.9- 1/2013\par 2.1- 4/2011\par 1.9- 4/2010\par \par  Pt had prostate biopsy around 2000- as per patient negative  [Urinary Incontinence] : no urinary incontinence [Urinary Frequency] : no urinary frequency [Nocturia] : no nocturia [Straining] : no straining [Weak Stream] : no weak stream [Dysuria] : no dysuria [Hematuria - Gross] : no gross hematuria

## 2020-10-21 NOTE — PHYSICAL EXAM
[General Appearance - Well Developed] : well developed [Abdomen Soft] : soft [Size (2+)] : size was 2+ [External Hemorrhoid] : an external hemorrhoid [Circumcised] : the penis was circumcised [Testicular Atrophy On The Right] : atrophic [Skin Color & Pigmentation] : normal skin color and pigmentation [Heart Rate And Rhythm] : Heart rate and rhythm were normal [] : no respiratory distress [Oriented To Time, Place, And Person] : oriented to person, place, and time [Normal Station and Gait] : the gait and station were normal for the patient's age [No Focal Deficits] : no focal deficits [Prostate Hard Area Or Nodule Bilaterally] : had no palpable nodules [Prostate Tenderness] : was not tender

## 2020-11-09 ENCOUNTER — APPOINTMENT (OUTPATIENT)
Dept: MRI IMAGING | Facility: CLINIC | Age: 80
End: 2020-11-09
Payer: MEDICARE

## 2020-11-09 ENCOUNTER — OUTPATIENT (OUTPATIENT)
Dept: OUTPATIENT SERVICES | Facility: HOSPITAL | Age: 80
LOS: 1 days | End: 2020-11-09
Payer: MEDICARE

## 2020-11-09 ENCOUNTER — RESULT REVIEW (OUTPATIENT)
Age: 80
End: 2020-11-09

## 2020-11-09 DIAGNOSIS — R97.20 ELEVATED PROSTATE SPECIFIC ANTIGEN [PSA]: ICD-10-CM

## 2020-11-09 DIAGNOSIS — Z98.89 OTHER SPECIFIED POSTPROCEDURAL STATES: Chronic | ICD-10-CM

## 2020-11-09 PROCEDURE — 76377 3D RENDER W/INTRP POSTPROCES: CPT | Mod: 26

## 2020-11-09 PROCEDURE — 76377 3D RENDER W/INTRP POSTPROCES: CPT

## 2020-11-09 PROCEDURE — 72197 MRI PELVIS W/O & W/DYE: CPT

## 2020-11-09 PROCEDURE — A9585: CPT

## 2020-11-09 PROCEDURE — 72197 MRI PELVIS W/O & W/DYE: CPT | Mod: 26

## 2020-11-20 ENCOUNTER — TRANSCRIPTION ENCOUNTER (OUTPATIENT)
Age: 80
End: 2020-11-20

## 2020-12-23 PROBLEM — Z12.11 ENCOUNTER FOR COLORECTAL CANCER SCREENING: Status: RESOLVED | Noted: 2019-09-20 | Resolved: 2020-12-23

## 2021-05-21 ENCOUNTER — APPOINTMENT (OUTPATIENT)
Dept: UROLOGY | Facility: CLINIC | Age: 81
End: 2021-05-21
Payer: MEDICARE

## 2021-05-21 VITALS
SYSTOLIC BLOOD PRESSURE: 106 MMHG | DIASTOLIC BLOOD PRESSURE: 66 MMHG | TEMPERATURE: 97 F | BODY MASS INDEX: 31.36 KG/M2 | HEIGHT: 71 IN | HEART RATE: 69 BPM | RESPIRATION RATE: 16 BRPM | WEIGHT: 224 LBS

## 2021-05-21 PROCEDURE — 99214 OFFICE O/P EST MOD 30 MIN: CPT

## 2021-05-21 PROCEDURE — 51798 US URINE CAPACITY MEASURE: CPT

## 2021-05-21 NOTE — ASSESSMENT
[FreeTextEntry1] : Bladder scan (no urge, voided little earlier prior): 460 cc PVR\par \par unable to give urine today; will return with it to drop off for lab\par \par PSA today in f/u\par \par D/w pt- restart Finasteride 5 mg po given retention, and long term risks, recognizing it may take time to improve\par \par Renal US within next week given persistent retention, to assess for stone/hydro\par \par Will review all by phone, arrange next f/u. D/w pt psa, screening risks and benefits in man age 80- will proceed as above\par \par Plan RTC 6 months with full exam, etc, if all else well at this time.

## 2021-05-21 NOTE — HISTORY OF PRESENT ILLNESS
[FreeTextEntry1] : \par 80 yr old male presents to establish care for elevated PSA. Was seeing Dr. Gorman, who retired and then Dr. Goldberg for BPH but switching care here. Pt was taking Finasteride but stopped when Dr. Walker retired about a year ago. Pt taking Doxazosin 4 mg daily. \par \par Found to have > 400 cc PVR last 10/2020--- NITA was 2+, no nodules or induration. No psa since- never did MRI prostate as was in [place] for winter. Feeling 'the same- no new complaints.\par \par PSA \par 7.38- 07/2020\par 1.51- 12/2019\par 1.740- 12/2018\par 1.5- 02/2017\par 1.79- 07/2016\par 1.9- 1/2013\par 2.1- 4/2011\par 1.9- 4/2010\par \par  Pt had prostate biopsy around 2000- as per patient negative \par \par Patient is currently experiencing urinary urgency, intermittency, post-void dribbling and erectile dysfunction, but no urinary incontinence, no urinary frequency, no nocturia, no straining, no weak stream, no dysuria and no gross hematuria. \par Pain Level: None  [None] : no symptoms

## 2021-05-25 ENCOUNTER — OUTPATIENT (OUTPATIENT)
Dept: OUTPATIENT SERVICES | Facility: HOSPITAL | Age: 81
LOS: 1 days | End: 2021-05-25
Payer: MEDICARE

## 2021-05-25 ENCOUNTER — APPOINTMENT (OUTPATIENT)
Dept: ULTRASOUND IMAGING | Facility: CLINIC | Age: 81
End: 2021-05-25
Payer: MEDICARE

## 2021-05-25 DIAGNOSIS — Z98.89 OTHER SPECIFIED POSTPROCEDURAL STATES: Chronic | ICD-10-CM

## 2021-05-25 DIAGNOSIS — R33.9 RETENTION OF URINE, UNSPECIFIED: ICD-10-CM

## 2021-05-25 PROCEDURE — 76775 US EXAM ABDO BACK WALL LIM: CPT

## 2021-05-25 PROCEDURE — 76775 US EXAM ABDO BACK WALL LIM: CPT | Mod: 26

## 2021-05-30 ENCOUNTER — APPOINTMENT (OUTPATIENT)
Dept: UROLOGY | Facility: CLINIC | Age: 81
End: 2021-05-30
Payer: MEDICARE

## 2021-05-30 LAB
APPEARANCE: CLEAR
BACTERIA: NEGATIVE
BILIRUBIN URINE: NEGATIVE
BLOOD URINE: NEGATIVE
COLOR: NORMAL
GLUCOSE QUALITATIVE U: ABNORMAL
HYALINE CASTS: 1 /LPF
KETONES URINE: NEGATIVE
LEUKOCYTE ESTERASE URINE: NEGATIVE
MICROSCOPIC-UA: NORMAL
NITRITE URINE: NEGATIVE
PH URINE: 6
PROTEIN URINE: NEGATIVE
PSA FREE FLD-MCNC: 36 %
PSA FREE SERPL-MCNC: 2.92 NG/ML
PSA SERPL-MCNC: 8.14 NG/ML
RED BLOOD CELLS URINE: 0 /HPF
SPECIFIC GRAVITY URINE: 1.03
SQUAMOUS EPITHELIAL CELLS: 4 /HPF
UROBILINOGEN URINE: NORMAL
WHITE BLOOD CELLS URINE: 1 /HPF

## 2021-05-30 PROCEDURE — 99442: CPT | Mod: 95

## 2021-05-30 NOTE — ASSESSMENT
[FreeTextEntry1] : WE discussed the psa, which is slightly higher than 7/2020, but pt with sig BPH, retention, and age 80---> all confounding variables. Free psa % of 36% suggests, statistically, only 16% risk for prostate cancer.\par \par D/w pt serial f/u now restarting finasteride, as well as options for MRI- given age, indicators, pt comfortable with planned 4 month f/u as scheduled. Will repeat psa, PVR at that time.\par \par Renal US without hydro at this time; will observe, as pt comfortable with current residual

## 2021-05-30 NOTE — HISTORY OF PRESENT ILLNESS
[FreeTextEntry1] : The patient-doctor relationship has been established in a face to face fashion via real time video/audio HIPAA compliant communication using telemedicine software. The patient's identity has been confirmed. The patient was previously emailed a copy of the telemedicine consent. They have had a chance to review and has now given verbal consent and has requested care to be assessed and treated via telemedicine. They understand there may be limitations in this process, and that they may need further followup care in the office and/or hospital settings.\par \par Verbal consent given on May 30 2021 11:40AM\par \par TERE RAM is a 80 year M who presents for f/u TTM today regarding PSA.\par \par Has retention with 440 cc PVR. Renal uS done 5/25/21 reviewed with pt- shows no hydro, large simple cyst on right, no stone or mass.\par \par PSA f/u showed \par 8.14 (free 36%)--> 5/21/21--restarted finasteride\par 7.38- 07/2020\par 1.51- 12/2019---> stopped finasteride\par 1.740- 12/2018\par 1.5- 02/2017\par 1.79- 07/2016\par 1.9- 1/2013\par 2.1- 4/2011\par 1.9- 4/2010\par \par  Pt had prostate biopsy around 2000- as per patient negative \par \par 05/30/2021 \par \par The patient denies fevers, chills, nausea and/or vomiting, and no unexplained weight loss.\par \par All pertinent parts of the patient PFSH (past medical, family, and social histories), laboratory, radiological studies and available physician notes were reviewed prior to starting the face-to-face portion of the telemedicine visit. Questionnaire results, where appropriate, were discussed with the patient.\par

## 2021-07-12 ENCOUNTER — NON-APPOINTMENT (OUTPATIENT)
Age: 81
End: 2021-07-12

## 2021-07-27 ENCOUNTER — APPOINTMENT (OUTPATIENT)
Dept: ORTHOPEDIC SURGERY | Facility: CLINIC | Age: 81
End: 2021-07-27
Payer: MEDICARE

## 2021-07-27 DIAGNOSIS — M17.11 UNILATERAL PRIMARY OSTEOARTHRITIS, RIGHT KNEE: ICD-10-CM

## 2021-07-27 PROCEDURE — 99214 OFFICE O/P EST MOD 30 MIN: CPT | Mod: 25

## 2021-07-27 PROCEDURE — 73564 X-RAY EXAM KNEE 4 OR MORE: CPT | Mod: RT

## 2021-07-27 PROCEDURE — 20610 DRAIN/INJ JOINT/BURSA W/O US: CPT | Mod: RT

## 2021-07-27 NOTE — HISTORY OF PRESENT ILLNESS
[de-identified] : 79yo male presents complaining of right knee pain for several years. Pain is intermittent. Reports intermittent swelling. Sometimes radiates down to his ankle. No injuries or trauma. He takes Tylenol arthritis with intermittent mild relief. Overall stable. Denies numbness tingling\par \par The patient's past medical history, past surgical history, medications, allergies, and social history were reviewed by me today with the patient and documented accordingly. In addition, the patient's family history, which is noncontributory to this visit, was also reviewed.\par

## 2021-07-27 NOTE — PHYSICAL EXAM
[de-identified] : General Exam\par \par Well developed, well nourished\par No apparent distress\par Oriented to person, place, and time\par Mood: Normal\par Affect: Normal\par Balance and coordination: Normal\par Gait: Normal\par \par right knee exam\par \par Skin: Clean, dry, intact\par Inspection: No obvious malalignment, no masses, + swelling, + effusion.\par Tenderness: mild MJLT. No LJLT. No tenderness over the medial and lateral patella facets. No ttp medial/lateral epicondyle, patella tendon, tibial tubercle, pes anserinus, or proximal fibula.\par ROM: 0 to 130° no pain with deep flexion in both knees\par Stability: Stable to varus, valgus, lachman testing. Negative anterior/posterior drawer.\par Additional tests: Negative McMurrays test, Negative patellar grind test. \par Strength: 5/5 Q/H/TA/GS/EHL, no atrophy\par Neuro: In tact to light touch throughout in dp/sp/tib/cherise/saph nerve districutions, DTR's normal\par Pulses: 2+ DP/PT pulses.\par  [de-identified] : \par The following radiographs were ordered and read by me during this patients visit. I reviewed each radiograph in detail with the patient and discussed the findings as highlighted below. \par \par 4 views right knee were obtained today. There is severe osteoarthritis with joint space narrowing osteophytes and sclerosis

## 2021-07-27 NOTE — DISCUSSION/SUMMARY
[de-identified] : I discussed the treatment of degenerative arthritis with the patient at length today. I described the spectrum of treatment from nonoperative modalities to total joint arthroplasty. Noninvasive and nonoperative treatment modalities include weight reduction, activity modification with low impact exercise, PRN use of acetaminophen or anti-inflammatory medication if tolerated, glucosamine/chondroitin supplements, and physical therapy. Further treatments can include corticosteroid injection and the use of hyaluronic acid injections. Definitive treatment can certainly include total joint arthroplasty also. The risks and benefits of each treatment options was discussed and all questions were answered.\par \par Injection: Right knee joint.\par Indication: Arthritis\par A discussion was had with the patient regarding this procedure and all questions were answered. All risks, benefits and alternatives were discussed. These included but were not limited to bleeding, infection, and allergic reaction. Alcohol was used to clean the skin, and betadine was used to sterilize and prep the area in the supero-lateral aspect of the right knee. Ethyl chloride spray was then used as a topical anesthetic. A 21-gauge needle was used to inject 4cc of 1% lidocaine and 1cc of 40mg/ml methylprednisolone into the knee. A sterile bandage was then applied. The patient tolerated the procedure well and there were no complications.

## 2021-10-01 ENCOUNTER — APPOINTMENT (OUTPATIENT)
Dept: UROLOGY | Facility: CLINIC | Age: 81
End: 2021-10-01
Payer: MEDICARE

## 2021-10-01 PROCEDURE — 99214 OFFICE O/P EST MOD 30 MIN: CPT

## 2021-10-01 NOTE — PHYSICAL EXAM
[General Appearance - Well Developed] : well developed [Edema] : no peripheral edema [] : no respiratory distress [Oriented To Time, Place, And Person] : oriented to person, place, and time [Normal Station and Gait] : the gait and station were normal for the patient's age [No Focal Deficits] : no focal deficits [Abdomen Soft] : soft [Abdomen Tenderness] : non-tender [Costovertebral Angle Tenderness] : no ~M costovertebral angle tenderness [Urinary Bladder Findings] : the bladder was normal on palpation [Rectal Exam - Rectum] : digital rectal exam was normal [No Prostate Nodules] : no prostate nodules [Prostate Size ___ (0-4)] : prostate size [unfilled] (scale: 0-4)

## 2021-10-01 NOTE — ASSESSMENT
[FreeTextEntry1] : Exam without sig change\par feeling well at this time.\par prior renal US 5/2021 without hydro.\par \par Cont finasteride (now back on 6 months)\par check psa free/total\par RTC 6 months with u/a, bladder scan (will be approx 1 year of finasteride restarted)\par \par Will review psa by phone once available

## 2021-10-02 LAB
PSA FREE FLD-MCNC: 32 %
PSA FREE SERPL-MCNC: 1.23 NG/ML
PSA SERPL-MCNC: 3.79 NG/ML

## 2021-10-04 ENCOUNTER — NON-APPOINTMENT (OUTPATIENT)
Age: 81
End: 2021-10-04

## 2021-10-14 ENCOUNTER — NON-APPOINTMENT (OUTPATIENT)
Age: 81
End: 2021-10-14

## 2021-12-04 ENCOUNTER — RX RENEWAL (OUTPATIENT)
Age: 81
End: 2021-12-04

## 2022-06-17 ENCOUNTER — APPOINTMENT (OUTPATIENT)
Dept: UROLOGY | Facility: CLINIC | Age: 82
End: 2022-06-17
Payer: MEDICARE

## 2022-06-17 PROCEDURE — 99214 OFFICE O/P EST MOD 30 MIN: CPT

## 2022-06-17 PROCEDURE — 51798 US URINE CAPACITY MEASURE: CPT

## 2022-06-17 NOTE — ASSESSMENT
[FreeTextEntry1] : Exam without sig change\par feeling well at this time.\par prior renal US 5/2021 without hydro.\par \par u/a today: unable to void- will return\par bladder scan:  (last voided at home, no urge now)- 411 cc\par \par Cont finasteride (now back on ~ 12 months)\par check psa free/total\par check u/a with micro when able in coming week or so\par will review labs by phone\par RTC 5 months

## 2022-06-17 NOTE — PHYSICAL EXAM
[General Appearance - Well Developed] : well developed [Abdomen Soft] : soft [Abdomen Tenderness] : non-tender [Costovertebral Angle Tenderness] : no ~M costovertebral angle tenderness [Urinary Bladder Findings] : the bladder was normal on palpation [Edema] : no peripheral edema [] : no respiratory distress [Oriented To Time, Place, And Person] : oriented to person, place, and time [Normal Station and Gait] : the gait and station were normal for the patient's age [No Focal Deficits] : no focal deficits [Urethral Meatus] : meatus normal [Penis Abnormality] : normal circumcised penis [Testes Tenderness] : no tenderness of the testes

## 2022-06-17 NOTE — HISTORY OF PRESENT ILLNESS
[None] : None [FreeTextEntry1] : 81 year M who presents for f/u regarding PSA, elevated PVR. Relatively mild sx at this time. Feels well, overall; no dysuria; more urge than prior. Some double voiding now, usually in AM. No hematuria. Returns from [place].\par \par Has retention with 440 cc PVR. Renal uS done 5/25/21 reviewed with pt- shows no hydro, large simple cyst on right, no stone or mass.\par \par PSA f/u showed \par 8.14 (free 36%)--> 5/21/21--restarted finasteride\par 7.38- 07/2020\par 1.51- 12/2019---> stopped finasteride\par 1.740- 12/2018\par 1.5- 02/2017\par 1.79- 07/2016\par 1.9- 1/2013\par 2.1- 4/2011\par 1.9- 4/2010\par \par  Pt had prostate biopsy around 2000- as per patient negative \par

## 2022-06-20 ENCOUNTER — NON-APPOINTMENT (OUTPATIENT)
Age: 82
End: 2022-06-20

## 2022-06-20 LAB
PSA FREE FLD-MCNC: 33 %
PSA FREE SERPL-MCNC: 0.81 NG/ML
PSA SERPL-MCNC: 2.48 NG/ML

## 2022-06-21 ENCOUNTER — NON-APPOINTMENT (OUTPATIENT)
Age: 82
End: 2022-06-21

## 2022-06-21 LAB
APPEARANCE: CLEAR
BACTERIA: NEGATIVE
BILIRUBIN URINE: NEGATIVE
BLOOD URINE: NEGATIVE
COLOR: NORMAL
GLUCOSE QUALITATIVE U: ABNORMAL
HYALINE CASTS: 0 /LPF
KETONES URINE: NEGATIVE
LEUKOCYTE ESTERASE URINE: NEGATIVE
MICROSCOPIC-UA: NORMAL
NITRITE URINE: NEGATIVE
PH URINE: 6
PROTEIN URINE: NORMAL
RED BLOOD CELLS URINE: 1 /HPF
SPECIFIC GRAVITY URINE: 1.03
SQUAMOUS EPITHELIAL CELLS: 1 /HPF
UROBILINOGEN URINE: NORMAL
WHITE BLOOD CELLS URINE: 1 /HPF

## 2022-06-28 NOTE — H&P CARDIOLOGY - PULMONARY EMBOLUS
Mom states yesterday pt was throwing up at random times- not associated with feedings. Mom states it looked like older milk from feeding. Mom states today he threw up right after feeding- it was fresh milk coming back up. Mom states he was still eating when it happened today. Mom states he is breastfeed (unsure of how many ounces he gets) and is relaxed- not gulping. Mom burps him after he is done- sometimes he burps sometimes he doesn't. Pt is a little extra fussy wants to be held and she states he is a \"farty\" baby\"    Mom denies temp- no congestion  Mom denies SOB and Wheezing. Mom denies no new food in her diet    Pt is wetting diapers- but last BM was 2 days ago. That bowel movement was runny and normal color. Mom is looking for recommendations. no

## 2022-07-15 ENCOUNTER — APPOINTMENT (OUTPATIENT)
Dept: MRI IMAGING | Facility: CLINIC | Age: 82
End: 2022-07-15

## 2022-07-20 ENCOUNTER — OUTPATIENT (OUTPATIENT)
Dept: OUTPATIENT SERVICES | Facility: HOSPITAL | Age: 82
LOS: 1 days | End: 2022-07-20
Payer: MEDICARE

## 2022-07-20 ENCOUNTER — APPOINTMENT (OUTPATIENT)
Dept: MRI IMAGING | Facility: CLINIC | Age: 82
End: 2022-07-20

## 2022-07-20 DIAGNOSIS — Z98.89 OTHER SPECIFIED POSTPROCEDURAL STATES: Chronic | ICD-10-CM

## 2022-07-20 DIAGNOSIS — Z00.8 ENCOUNTER FOR OTHER GENERAL EXAMINATION: ICD-10-CM

## 2022-07-20 PROCEDURE — 70553 MRI BRAIN STEM W/O & W/DYE: CPT | Mod: 26,MH

## 2022-07-20 PROCEDURE — A9585: CPT

## 2022-07-20 PROCEDURE — 70553 MRI BRAIN STEM W/O & W/DYE: CPT

## 2022-08-09 ENCOUNTER — APPOINTMENT (OUTPATIENT)
Dept: GASTROENTEROLOGY | Facility: CLINIC | Age: 82
End: 2022-08-09

## 2022-08-09 VITALS
HEART RATE: 92 BPM | OXYGEN SATURATION: 96 % | SYSTOLIC BLOOD PRESSURE: 117 MMHG | WEIGHT: 224 LBS | BODY MASS INDEX: 31.36 KG/M2 | HEIGHT: 71 IN | DIASTOLIC BLOOD PRESSURE: 77 MMHG

## 2022-08-09 DIAGNOSIS — R10.32 LEFT LOWER QUADRANT PAIN: ICD-10-CM

## 2022-08-09 PROCEDURE — 99214 OFFICE O/P EST MOD 30 MIN: CPT

## 2022-08-09 NOTE — PHYSICAL EXAM
[General Appearance - Alert] : alert [General Appearance - In No Acute Distress] : in no acute distress [Sclera] : the sclera and conjunctiva were normal [Extraocular Movements] : extraocular movements were intact [Outer Ear] : the ears and nose were normal in appearance [Hearing Threshold Finger Rub Not [place]] : hearing was normal [Neck Appearance] : the appearance of the neck was normal [Neck Cervical Mass (___cm)] : no neck mass was observed [Auscultation Breath Sounds / Voice Sounds] : lungs were clear to auscultation bilaterally [Heart Rate And Rhythm] : heart rate was normal and rhythm regular [Heart Sounds] : normal S1 and S2 [Heart Sounds Gallop] : no gallops [Murmurs] : no murmurs [Heart Sounds Pericardial Friction Rub] : no pericardial rub [Bowel Sounds] : normal bowel sounds [Abdomen Soft] : soft [Abdomen Tenderness] : non-tender [Abdomen Mass (___ Cm)] : no abdominal mass palpated [FreeTextEntry1] : central obesity [Cervical Lymph Nodes Enlarged Posterior Bilaterally] : posterior cervical [Supraclavicular Lymph Nodes Enlarged Bilaterally] : supraclavicular [Abnormal Walk] : normal gait [Nail Clubbing] : no clubbing  or cyanosis of the fingernails [Skin Color & Pigmentation] : normal skin color and pigmentation [] : no rash [Oriented To Time, Place, And Person] : oriented to person, place, and time [Impaired Insight] : insight and judgment were intact [Affect] : the affect was normal

## 2022-08-09 NOTE — ASSESSMENT
[FreeTextEntry1] : IMPRESSION \par #  GERD years - beching, pheml, marichuy tatse - omeprazole two weeks - feeling a little better \par -  As per ENT - severe laryngeal reflux seen on laryngoscopy. Chronic symptoms of phlegm in throat and sour taste in the morning. ENT would like an upper endoscopy to be considered. \par -  EGD on 9/2018:   irregular Z line s/p bx (moderate inflammation neg for IM), near the pylorus there was a small area where the mucosa appeared heaped up with a central mucous plug that could not be washed off questionable ulcer s/p bx (chronic gastritis neg for HP, neg for malignancy. As per the pathologist these changes may be adjacent to gastric ulcers). Mild gastric erythema s/p bx (moderate to severe gastritis neg for HP), nml duodenum s/p bx (neg for celiac disease).\par \par #  History of LLQ pain - dull discomfort intermittent - better when belching\par -  CT colonography on 10/2019:  Gallstone.  Right renal cysts, enlarged prostate.  Fatty liver.  No colon mass. \par -  Last colonoscopy was 6 years prior to 2018, a polyp was removed and was told by his GI he did not need another one. \par \par #  Gallstone - denies RUQ or epigastric pain\par -  Abdominal ultrasound on 8/2018:  10 mm Gallstone.  Fatty liver. \par -  Seen by Surgeon - stone not felt to be the cause of his GI complaints. \par \par #  Comorbidities: History of HTN, hyperlipidemia, DM\par \par \par PLAN: \par I will plan for an upper endoscopy under monitored anesthesia care to rule out esophagitis, peptic ulcer disease, H.pylori gastritis etc.   Risks, benefits, and alternatives of the procedure were discussed with the patient. Patient understands and agrees to proceed with the planned procedure.\par \par Will need medical clearance. \par \par He will continue omeprazole 40 mg once a day until the procedure.  \par \par GERD diet reviewed - weight loss encouraged. \par \par I had a long discussion with the patient with regards to fatty liver, and the natural progression to possible DON an ultimately cirrhosis.  I have discussed the importance of loosing wt with diet and exercise to improve fatty liver.  Yearly LFTs.\par \par Abdominal ultrasound \par \par Advised see surgeon again if biliary cholic

## 2022-08-09 NOTE — HISTORY OF PRESENT ILLNESS
[de-identified] : 80 y/o man with Hx of HTN, hyperlipidemia, DM, gallstone, GERD, referred by ENT for an upper endoscopy.  \par \par Belching, mucous  regurgitating  , runny nose every morning  - says always has had reflux for years- he is taking omeprazole 40 mg once a day - since past 2 weeks -feeling a little better. "Terrible taste in mouth" -\par \par Two weeks ago nausea, with retching.  \par \par ENT also performed MRI for hearing loss - unremarkable exam as per patient.  At times out of balance. \par \par As per ENT - severe laryngeal reflux seen on laryngoscopy.  Chronic symptoms of phlegm in throat and sour taste in the morning. ENT would like an upper endoscopy to be considered.  \par Last two weeks his sugars have been elevated - seeing his endocrinologist. \par \par Patient denies having difficulty swallowing, painful swallowing, loss of appetite, weight loss, melena and hematochezia.\par \par All other review of systems are negative.  Denies cardiac symptoms.\par \par Initial office visit 8/2018: \par He says since the past 6 weeks he has been having indigestion. He describes having an annoying discomfort in epigastric area with where it "builds itself up", where he will then belch - this occurs once or a few times a day. Denies heartburn, or burning in chest. When he eats fast he will have worsening indigestion. \par \par Has not taken antacids. \par \par Advil or Aleve, twice a month, when knees start to hurt. \par \par He says he would not have brought these complaints to the attention of his doctors if his brother did not have a heart attack. He himself was recently evaluated by his cardiologist and underwent an angiogram that was unremarkable - reports he did not have to have a stent placed. His cardiologist advised him to see GI for his present complaints. \par \par Pt denies having dysphagia, odynophagia, nausea, vomiting, fevers, chills, jaundice, loss of appetite, wt loss, constipation, diarrhea, melena and bright red blood per rectum.\par \par Never had EGD. \par \par Last colonoscopy was 6 years ago, a polyp was removed and was told by his GI he did not need another one. \par \par No GI cancers in family. \par  \par \par Office visit 9/2019: \par Reports having a colonoscopy 6 or 7 years ago and a polyp was removed. His PCP would like another colonoscopy. \par \par Patient reports having a discomfort in his left lower quadrant area since the past 2 weeks. He says it's a very mild discomfort. Intermittent. He says when he stands up he feels better.\par \par At times he belches. He offers no other complaints. \par \par Pt denies having heartburn, dysphagia, odynophagia, nausea, vomiting, fevers, chills, jaundice, loss of appetite, wt loss, constipation, diarrhea, hematochezia and melena.\par \par On September 2018 the patient underwent an upper endoscopy and was found to have irregular Z line status post biopsy, in the antrum near the pylorus there was a small area where the mucosa appeared heaped up with a central mucous plug that could not be washed off questionable ulcer status post biopsy. Mild antral and body erythema status post biopsies, normal duodenum status post biopsy.\par \par Biopsy of a questionable ulcer revealed active chronic antral gastritis moderate with reactive gastropathy negative for H. pylori bacteria. As per the pathologist these changes may be adjacent to gastric ulcers. Negative for malignancy.\par \par GE junction biopsies revealed chronic active inflammation moderate with scattered eosinophils consistent with reflux changes negative for intestinal metaplasia and dysplasia.\par \par Seen by Surgeon for possible lap anthony of Gallstone measuring 1 cm - stone not felt to be the cause of his GI complaints. \par \par \par \par Discussion/Summary 10/2019: \par Called patient and notified him that we don't have records of previous colonoscopy - his prior GI is retired and apparently all records are destroyed. Discussed again colonoscopy as recommended by his PCP, and alternatives CT colonography (limitations reviewed) - patient would like to proceed with CT colonography. Will order.

## 2022-08-22 ENCOUNTER — NON-APPOINTMENT (OUTPATIENT)
Age: 82
End: 2022-08-22

## 2022-08-22 ENCOUNTER — OUTPATIENT (OUTPATIENT)
Dept: OUTPATIENT SERVICES | Facility: HOSPITAL | Age: 82
LOS: 1 days | End: 2022-08-22
Payer: MEDICARE

## 2022-08-22 ENCOUNTER — APPOINTMENT (OUTPATIENT)
Dept: ULTRASOUND IMAGING | Facility: CLINIC | Age: 82
End: 2022-08-22

## 2022-08-22 DIAGNOSIS — Z98.89 OTHER SPECIFIED POSTPROCEDURAL STATES: Chronic | ICD-10-CM

## 2022-08-22 DIAGNOSIS — R10.32 LEFT LOWER QUADRANT PAIN: ICD-10-CM

## 2022-08-22 PROCEDURE — 76700 US EXAM ABDOM COMPLETE: CPT

## 2022-08-22 PROCEDURE — 76700 US EXAM ABDOM COMPLETE: CPT | Mod: 26

## 2022-08-29 ENCOUNTER — RESULT CHARGE (OUTPATIENT)
Age: 82
End: 2022-08-29

## 2022-08-30 ENCOUNTER — APPOINTMENT (OUTPATIENT)
Dept: INTERNAL MEDICINE | Facility: CLINIC | Age: 82
End: 2022-08-30

## 2022-08-30 ENCOUNTER — NON-APPOINTMENT (OUTPATIENT)
Age: 82
End: 2022-08-30

## 2022-08-30 VITALS
OXYGEN SATURATION: 98 % | HEART RATE: 74 BPM | TEMPERATURE: 97.8 F | DIASTOLIC BLOOD PRESSURE: 62 MMHG | RESPIRATION RATE: 12 BRPM | SYSTOLIC BLOOD PRESSURE: 110 MMHG

## 2022-08-30 VITALS — WEIGHT: 223 LBS | HEIGHT: 71 IN | BODY MASS INDEX: 31.22 KG/M2

## 2022-08-30 DIAGNOSIS — Z01.818 ENCOUNTER FOR OTHER PREPROCEDURAL EXAMINATION: ICD-10-CM

## 2022-08-30 DIAGNOSIS — Z87.19 PERSONAL HISTORY OF OTHER DISEASES OF THE DIGESTIVE SYSTEM: ICD-10-CM

## 2022-08-30 DIAGNOSIS — M75.41 IMPINGEMENT SYNDROME OF RIGHT SHOULDER: ICD-10-CM

## 2022-08-30 DIAGNOSIS — I10 ESSENTIAL (PRIMARY) HYPERTENSION: ICD-10-CM

## 2022-08-30 PROCEDURE — 93000 ELECTROCARDIOGRAM COMPLETE: CPT

## 2022-08-30 PROCEDURE — 99214 OFFICE O/P EST MOD 30 MIN: CPT | Mod: 25

## 2022-08-30 PROCEDURE — 36415 COLL VENOUS BLD VENIPUNCTURE: CPT

## 2022-08-30 RX ORDER — OMEPRAZOLE 40 MG/1
40 CAPSULE, DELAYED RELEASE ORAL
Qty: 30 | Refills: 2 | Status: ACTIVE | COMMUNITY
Start: 2022-08-30

## 2022-08-30 RX ORDER — TRIAMCINOLONE ACETONIDE 1 MG/G
0.1 CREAM TOPICAL
Qty: 454 | Refills: 0 | Status: COMPLETED | COMMUNITY
Start: 2018-04-11 | End: 2022-08-30

## 2022-08-30 NOTE — PHYSICAL EXAM
[Normal Sclera/Conjunctiva] : normal sclera/conjunctiva [No JVD] : no jugular venous distention [No Carotid Bruits] : no carotid bruits [No Edema] : there was no peripheral edema [Soft] : abdomen soft [Non Tender] : non-tender [Coordination Grossly Intact] : coordination grossly intact [No Focal Deficits] : no focal deficits [Normal Gait] : normal gait [Normal] : affect was normal and insight and judgment were intact

## 2022-08-31 LAB
ALBUMIN SERPL ELPH-MCNC: 4.5 G/DL
ALP BLD-CCNC: 79 U/L
ALT SERPL-CCNC: 25 U/L
ANION GAP SERPL CALC-SCNC: 18 MMOL/L
APTT BLD: 29 SEC
AST SERPL-CCNC: 20 U/L
BASOPHILS # BLD AUTO: 0.07 K/UL
BASOPHILS NFR BLD AUTO: 1.2 %
BILIRUB SERPL-MCNC: 0.3 MG/DL
BUN SERPL-MCNC: 28 MG/DL
CALCIUM SERPL-MCNC: 9.9 MG/DL
CHLORIDE SERPL-SCNC: 101 MMOL/L
CO2 SERPL-SCNC: 21 MMOL/L
CREAT SERPL-MCNC: 1.22 MG/DL
EGFR: 60 ML/MIN/1.73M2
EOSINOPHIL # BLD AUTO: 0.32 K/UL
EOSINOPHIL NFR BLD AUTO: 5.5 %
ESTIMATED AVERAGE GLUCOSE: 146 MG/DL
GLUCOSE SERPL-MCNC: 144 MG/DL
HBA1C MFR BLD HPLC: 6.7 %
HCT VFR BLD CALC: 48.2 %
HGB BLD-MCNC: 15.5 G/DL
IMM GRANULOCYTES NFR BLD AUTO: 0.7 %
INR PPP: 0.97 RATIO
LYMPHOCYTES # BLD AUTO: 1.3 K/UL
LYMPHOCYTES NFR BLD AUTO: 22.4 %
MAN DIFF?: NORMAL
MCHC RBC-ENTMCNC: 29.6 PG
MCHC RBC-ENTMCNC: 32.2 GM/DL
MCV RBC AUTO: 92 FL
MONOCYTES # BLD AUTO: 0.49 K/UL
MONOCYTES NFR BLD AUTO: 8.4 %
NEUTROPHILS # BLD AUTO: 3.59 K/UL
NEUTROPHILS NFR BLD AUTO: 61.8 %
PLATELET # BLD AUTO: 196 K/UL
POTASSIUM SERPL-SCNC: 4.5 MMOL/L
PROT SERPL-MCNC: 7.4 G/DL
PT BLD: 11.4 SEC
RBC # BLD: 5.24 M/UL
RBC # FLD: 14.6 %
SODIUM SERPL-SCNC: 139 MMOL/L
WBC # FLD AUTO: 5.81 K/UL

## 2022-08-31 NOTE — ASSESSMENT
[Patient Optimized for Surgery] : Patient optimized for surgery [No Further Testing Recommended] : no further testing recommended [Modify medications prior to procedure] : Modify medications prior to procedure [As per surgery] : as per surgery [FreeTextEntry4] : 80 y/o M with h/o DM II, Hypertension, GERD is presently medically optimized for procedure. [FreeTextEntry7] : Hold Metformin, Jardiance and Januvia on AM of procedure. Take Valsartan-HCT with sip of water on AM of procedure.

## 2022-08-31 NOTE — HISTORY OF PRESENT ILLNESS
[No Pertinent Cardiac History] : no history of aortic stenosis, atrial fibrillation, coronary artery disease, recent myocardial infarction, or implantable device/pacemaker [No Pertinent Pulmonary History] : no history of asthma, COPD, sleep apnea, or smoking [No Adverse Anesthesia Reaction] : no adverse anesthesia reaction in self or family member [Diabetes] : diabetes [(Patient denies any chest pain, claudication, dyspnea on exertion, orthopnea, palpitations or syncope)] : Patient denies any chest pain, claudication, dyspnea on exertion, orthopnea, palpitations or syncope [Moderate (4-6 METs)] : Moderate (4-6 METs) [Chronic Anticoagulation] : no chronic anticoagulation [Chronic Kidney Disease] : no chronic kidney disease [FreeTextEntry1] : Endoscopy under anesthesia [FreeTextEntry2] : 09/07/2022 [FreeTextEntry3] : Dr. Ruggiero Sayedy [FreeTextEntry4] : TERE RAM is a 81 year old male with history of Diabetes Mellitus 2, Hyperlipidemia, Hypertension, Gout, BPH, GERD who presents for medical optimization for proposed procedure.  [FreeTextEntry7] : EKG done in office today, interpreted below\par Stress test 08/2020: negative for ischemia

## 2022-08-31 NOTE — REVIEW OF SYSTEMS
[Heartburn] : heartburn [Negative] : Psychiatric [Abdominal Pain] : no abdominal pain [Nausea] : no nausea [Constipation] : no constipation [Vomiting] : no vomiting [Melena] : no melena [FreeTextEntry9] : chronic knee pains

## 2022-08-31 NOTE — RESULTS/DATA
[] : results reviewed [de-identified] : CBC done in office today--all acceptable for surgery [de-identified] : PT/PTT/INR in office today--all acceptable for surgery [de-identified] : CMP in office today--acceptable for surgery [de-identified] : NSR 70 bom, intraventricular conduction delay, no acute ST-T wave abnormalities [de-identified] : HgbA1C 6.7%--acceptable.

## 2022-09-02 DIAGNOSIS — R10.13 EPIGASTRIC PAIN: ICD-10-CM

## 2022-09-06 ENCOUNTER — TRANSCRIPTION ENCOUNTER (OUTPATIENT)
Age: 82
End: 2022-09-06

## 2022-09-07 ENCOUNTER — APPOINTMENT (OUTPATIENT)
Dept: GASTROENTEROLOGY | Facility: HOSPITAL | Age: 82
End: 2022-09-07

## 2022-09-07 ENCOUNTER — RESULT REVIEW (OUTPATIENT)
Age: 82
End: 2022-09-07

## 2022-09-07 ENCOUNTER — OUTPATIENT (OUTPATIENT)
Dept: OUTPATIENT SERVICES | Facility: HOSPITAL | Age: 82
LOS: 1 days | End: 2022-09-07
Payer: MEDICARE

## 2022-09-07 DIAGNOSIS — Z98.89 OTHER SPECIFIED POSTPROCEDURAL STATES: Chronic | ICD-10-CM

## 2022-09-07 DIAGNOSIS — K21.9 GASTRO-ESOPHAGEAL REFLUX DISEASE WITHOUT ESOPHAGITIS: ICD-10-CM

## 2022-09-07 LAB — SARS-COV-2 N GENE NPH QL NAA+PROBE: NOT DETECTED

## 2022-09-07 PROCEDURE — 88312 SPECIAL STAINS GROUP 1: CPT | Mod: 26

## 2022-09-07 PROCEDURE — 88313 SPECIAL STAINS GROUP 2: CPT

## 2022-09-07 PROCEDURE — 43239 EGD BIOPSY SINGLE/MULTIPLE: CPT

## 2022-09-07 PROCEDURE — 88312 SPECIAL STAINS GROUP 1: CPT

## 2022-09-07 PROCEDURE — 82962 GLUCOSE BLOOD TEST: CPT

## 2022-09-07 PROCEDURE — 88305 TISSUE EXAM BY PATHOLOGIST: CPT | Mod: 26

## 2022-09-07 PROCEDURE — 88305 TISSUE EXAM BY PATHOLOGIST: CPT

## 2022-09-07 PROCEDURE — 88313 SPECIAL STAINS GROUP 2: CPT | Mod: 26

## 2022-09-07 DEVICE — ESOPHAGEAL BALLOON CATH CRE FIXED WIRE 6-7-8MM: Type: IMPLANTABLE DEVICE | Status: FUNCTIONAL

## 2022-09-12 ENCOUNTER — RX RENEWAL (OUTPATIENT)
Age: 82
End: 2022-09-12

## 2022-09-12 RX ORDER — DOXAZOSIN 4 MG/1
4 TABLET ORAL DAILY
Qty: 90 | Refills: 3 | Status: ACTIVE | COMMUNITY
Start: 2020-09-10 | End: 1900-01-01

## 2022-09-14 ENCOUNTER — NON-APPOINTMENT (OUTPATIENT)
Age: 82
End: 2022-09-14

## 2022-10-06 ENCOUNTER — TRANSCRIPTION ENCOUNTER (OUTPATIENT)
Age: 82
End: 2022-10-06

## 2022-10-06 ENCOUNTER — APPOINTMENT (OUTPATIENT)
Dept: GASTROENTEROLOGY | Facility: CLINIC | Age: 82
End: 2022-10-06

## 2022-10-06 VITALS
SYSTOLIC BLOOD PRESSURE: 105 MMHG | HEIGHT: 71 IN | HEART RATE: 71 BPM | DIASTOLIC BLOOD PRESSURE: 66 MMHG | OXYGEN SATURATION: 96 % | BODY MASS INDEX: 31.5 KG/M2 | WEIGHT: 225 LBS

## 2022-10-06 DIAGNOSIS — K76.0 FATTY (CHANGE OF) LIVER, NOT ELSEWHERE CLASSIFIED: ICD-10-CM

## 2022-10-06 DIAGNOSIS — R05.9 COUGH, UNSPECIFIED: ICD-10-CM

## 2022-10-06 DIAGNOSIS — K21.9 GASTRO-ESOPHAGEAL REFLUX DISEASE W/OUT ESOPHAGITIS: ICD-10-CM

## 2022-10-06 DIAGNOSIS — K80.20 CALCULUS OF GALLBLADDER W/OUT CHOLECYSTITIS W/OUT OBSTRUCTION: ICD-10-CM

## 2022-10-06 PROCEDURE — 99213 OFFICE O/P EST LOW 20 MIN: CPT

## 2022-10-06 NOTE — PHYSICAL EXAM
[Bowel Sounds] : normal bowel sounds [Abdomen Tenderness] : non-tender [No Masses] : no abdominal mass palpated [Abdomen Soft] : soft [No Clubbing, Cyanosis] : no clubbing or cyanosis of the fingernails [Normal Color / Pigmentation] : normal skin color and pigmentation [] : no rash [Normal] : oriented to person, place, and time [de-identified] : Central obesity

## 2022-10-06 NOTE — HISTORY OF PRESENT ILLNESS
[FreeTextEntry1] : 81 y/o man with Hx of HTN, hyperlipidemia, DM, gallstone, fatty liver, GERD, who presents after recent upper endoscopy. \par \par No longer on omeprazole.  \par Hoarse throat -Every now an then will cough - cough up mucous - coughing less - usually in the morning - better. \par Moving to [place] soon for the winter. \par \par \par Initial office visit 8/2018: \par He says since the past 6 weeks he has been having indigestion. He describes having an annoying discomfort in epigastric area with where it "builds itself up", where he will then belch - this occurs once or a few times a day. Denies heartburn, or burning in chest. When he eats fast he will have worsening indigestion. \par \par Has not taken antacids. \par \par Advil or Aleve, twice a month, when knees start to hurt. \par \par He says he would not have brought these complaints to the attention of his doctors if his brother did not have a heart attack. He himself was recently evaluated by his cardiologist and underwent an angiogram that was unremarkable - reports he did not have to have a stent placed. His cardiologist advised him to see GI for his present complaints. \par \par Pt denies having dysphagia, odynophagia, nausea, vomiting, fevers, chills, jaundice, loss of appetite, wt loss, constipation, diarrhea, melena and bright red blood per rectum.\par \par Never had EGD. \par \par Last colonoscopy was 6 years ago, a polyp was removed and was told by his GI he did not need another one. \par \par No GI cancers in family. \par  \par \par Office visit 9/2019: \par Reports having a colonoscopy 6 or 7 years ago and a polyp was removed. His PCP would like another colonoscopy. \par \par Patient reports having a discomfort in his left lower quadrant area since the past 2 weeks. He says it's a very mild discomfort. Intermittent. He says when he stands up he feels better.\par \par At times he belches. He offers no other complaints. \par \par Pt denies having heartburn, dysphagia, odynophagia, nausea, vomiting, fevers, chills, jaundice, loss of appetite, wt loss, constipation, diarrhea, hematochezia and melena.\par \par On September 2018 the patient underwent an upper endoscopy and was found to have irregular Z line status post biopsy, in the antrum near the pylorus there was a small area where the mucosa appeared heaped up with a central mucous plug that could not be washed off questionable ulcer status post biopsy. Mild antral and body erythema status post biopsies, normal duodenum status post biopsy.\par \par Biopsy of a questionable ulcer revealed active chronic antral gastritis moderate with reactive gastropathy negative for H. pylori bacteria. As per the pathologist these changes may be adjacent to gastric ulcers. Negative for malignancy.\par \par GE junction biopsies revealed chronic active inflammation moderate with scattered eosinophils consistent with reflux changes negative for intestinal metaplasia and dysplasia.\par \par Seen by Surgeon for possible lap anthony of Gallstone measuring 1 cm - stone not felt to be the cause of his GI complaints. \par \par \par \par Discussion/Summary 10/2019: \par Called patient and notified him that we don't have records of previous colonoscopy - his prior GI is retired and apparently all records are destroyed. Discussed again colonoscopy as recommended by his PCP, and alternatives CT colonography (limitations reviewed) - patient would like to proceed with CT colonography. Will order. \par \par \par Office visit 8/2022: \par Belching, mucous regurgitating , runny nose every morning - says always has had reflux for years- he is taking omeprazole 40 mg once a day - since past 2 weeks -feeling a little better. "Terrible taste in mouth" -\par \par Two weeks ago nausea, with retching. \par \par ENT also performed MRI for hearing loss - unremarkable exam as per patient. At times out of balance. \par \par As per ENT - severe laryngeal reflux seen on laryngoscopy. Chronic symptoms of phlegm in throat and sour taste in the morning. ENT would like an upper endoscopy to be considered. \par Last two weeks his sugars have been elevated - seeing his endocrinologist. \par \par Patient denies having difficulty swallowing, painful swallowing, loss of appetite, weight loss, melena and hematochezia.\par \par \par Discussion/Summary 8/2022: \par Called patient and reviewed recent abdominal ultrasound - multiple gallstones without acute anthony - he denies having biliary cholic - has upper abdominal pain only lasts for a "few seconds" and in the morning. Advised seeing surgeon if biliary cholic - office contact information - wanted to hold off at this time. \par \par  \par Discussion/Summary 9/2022\par called and results reviewed -0 he is feeling better - he will hold off on omeprazole - if reflux recurs he will contact me. \par \par

## 2022-10-11 ENCOUNTER — APPOINTMENT (OUTPATIENT)
Dept: RADIOLOGY | Facility: CLINIC | Age: 82
End: 2022-10-11

## 2022-10-11 ENCOUNTER — OUTPATIENT (OUTPATIENT)
Dept: OUTPATIENT SERVICES | Facility: HOSPITAL | Age: 82
LOS: 1 days | End: 2022-10-11
Payer: MEDICARE

## 2022-10-11 DIAGNOSIS — Z98.89 OTHER SPECIFIED POSTPROCEDURAL STATES: Chronic | ICD-10-CM

## 2022-10-11 DIAGNOSIS — R05.9 COUGH, UNSPECIFIED: ICD-10-CM

## 2022-10-11 PROCEDURE — 71046 X-RAY EXAM CHEST 2 VIEWS: CPT | Mod: 26

## 2022-10-11 PROCEDURE — 71046 X-RAY EXAM CHEST 2 VIEWS: CPT

## 2022-10-14 ENCOUNTER — LABORATORY RESULT (OUTPATIENT)
Age: 82
End: 2022-10-14

## 2022-10-14 ENCOUNTER — APPOINTMENT (OUTPATIENT)
Dept: UROLOGY | Facility: CLINIC | Age: 82
End: 2022-10-14

## 2022-10-14 PROCEDURE — 51798 US URINE CAPACITY MEASURE: CPT

## 2022-10-14 PROCEDURE — 99214 OFFICE O/P EST MOD 30 MIN: CPT

## 2022-10-14 NOTE — PHYSICAL EXAM
[General Appearance - Well Developed] : well developed [Abdomen Soft] : soft [Abdomen Tenderness] : non-tender [Costovertebral Angle Tenderness] : no ~M costovertebral angle tenderness [Urinary Bladder Findings] : the bladder was normal on palpation [Testes Tenderness] : no tenderness of the testes [Edema] : no peripheral edema [] : no respiratory distress [Oriented To Time, Place, And Person] : oriented to person, place, and time [Normal Station and Gait] : the gait and station were normal for the patient's age [No Focal Deficits] : no focal deficits [FreeTextEntry1] : remainder deferred pt request

## 2022-10-14 NOTE — HISTORY OF PRESENT ILLNESS
[None] : None [FreeTextEntry1] : Now 82 year M who presents for f/u regarding PSA, elevated PVR. Relatively mild sx at this time. Feels well, overall; no dysuria; more urge than prior. Some double voiding now, usually in AM. No hematuria. Returns from [place].\par \par Has retention with 440 cc PVR. Renal uS done 5/25/21 reviewed with pt- shows no hydro, large simple cyst on right, no stone or mass.\par \par PSA f/u showed \par 2.48--6/2022--free > 30%\par 3.79--10/2021 free > 30%\par 8.14 (free 36%)--> 5/21/21--restarted finasteride\par 7.38- 07/2020\par 1.51- 12/2019---> stopped finasteride\par 1.740- 12/2018\par 1.5- 02/2017\par 1.79- 07/2016\par 1.9- 1/2013\par 2.1- 4/2011\par 1.9- 4/2010\par \par  Pt had prostate biopsy around 2000- as per patient negative \par

## 2022-10-14 NOTE — ASSESSMENT
[FreeTextEntry1] : Exam without sig change\par feeling well at this time.\par prior renal US 5/2021 without hydro.\par \par u/a today: no urge, unable to\par bladder scan: 346 cc vol today\par \par Cont finasteride (now back on ~ 12 months)\par check psa free/total with next return appt approx 6 months\par pt will do u/a, culture at lab on his own\par

## 2022-10-15 ENCOUNTER — OUTPATIENT (OUTPATIENT)
Dept: OUTPATIENT SERVICES | Facility: HOSPITAL | Age: 82
LOS: 1 days | End: 2022-10-15
Payer: MEDICARE

## 2022-10-15 ENCOUNTER — APPOINTMENT (OUTPATIENT)
Dept: CT IMAGING | Facility: CLINIC | Age: 82
End: 2022-10-15

## 2022-10-15 DIAGNOSIS — R05.9 COUGH, UNSPECIFIED: ICD-10-CM

## 2022-10-15 DIAGNOSIS — Z98.89 OTHER SPECIFIED POSTPROCEDURAL STATES: Chronic | ICD-10-CM

## 2022-10-15 PROCEDURE — 71250 CT THORAX DX C-: CPT | Mod: 26,MH

## 2022-10-15 PROCEDURE — 71250 CT THORAX DX C-: CPT

## 2022-10-19 ENCOUNTER — NON-APPOINTMENT (OUTPATIENT)
Age: 82
End: 2022-10-19

## 2022-10-20 ENCOUNTER — NON-APPOINTMENT (OUTPATIENT)
Age: 82
End: 2022-10-20

## 2023-05-31 ENCOUNTER — APPOINTMENT (OUTPATIENT)
Dept: UROLOGY | Facility: CLINIC | Age: 83
End: 2023-05-31
Payer: MEDICARE

## 2023-05-31 DIAGNOSIS — E78.5 TYPE 2 DIABETES MELLITUS WITH OTHER SPECIFIED COMPLICATION: ICD-10-CM

## 2023-05-31 DIAGNOSIS — E11.69 TYPE 2 DIABETES MELLITUS WITH OTHER SPECIFIED COMPLICATION: ICD-10-CM

## 2023-05-31 DIAGNOSIS — E11.9 TYPE 2 DIABETES MELLITUS W/OUT COMPLICATIONS: ICD-10-CM

## 2023-05-31 PROCEDURE — 99214 OFFICE O/P EST MOD 30 MIN: CPT

## 2023-05-31 RX ORDER — SITAGLIPTIN 100 MG/1
100 TABLET, FILM COATED ORAL
Qty: 90 | Refills: 2 | Status: COMPLETED | COMMUNITY
Start: 2022-08-30 | End: 2023-05-31

## 2023-05-31 RX ORDER — ACETAMINOPHEN 500 MG/1
500 TABLET ORAL
Refills: 0 | Status: ACTIVE | COMMUNITY

## 2023-05-31 NOTE — ASSESSMENT
[FreeTextEntry1] : \par feeling well at this time.\par prior renal US 5/2021 without hydro.\par \par u/a today: no urge, unable to\par bladder scan: 245 cc vol today\par \par Cont finasteride (now back on ~ 12 months)\par check psa free/total with next return appt approx 6 months\par pt will do u/a, culture at lab on his own

## 2023-05-31 NOTE — HISTORY OF PRESENT ILLNESS
[FreeTextEntry1] : Now 82 year M who presents for f/u regarding PSA, elevated PVR. Relatively mild sx at this time. Feels well, overall; no dysuria; more urge than prior. Some double voiding now, usually in AM. No hematuria. \par \par Has retention with 440 cc PVR. Renal uS done 5/25/21 reviewed with pt- shows no hydro, large simple cyst on right, no stone or mass.\par \par PSA f/u showed \par 2.48--6/2022--free > 30%\par 3.79--10/2021 free > 30%\par 8.14 (free 36%)--> 5/21/21--restarted finasteride\par 7.38- 07/2020\par 1.51- 12/2019---> stopped finasteride\par 1.740- 12/2018\par 1.5- 02/2017\par 1.79- 07/2016\par 1.9- 1/2013\par 2.1- 4/2011\par 1.9- 4/2010\par \par  Pt had prostate biopsy around 2000- as per patient negative

## 2023-06-01 LAB
PSA FREE FLD-MCNC: 39 %
PSA FREE SERPL-MCNC: 1 NG/ML
PSA SERPL-MCNC: 2.56 NG/ML

## 2023-06-09 LAB
ANION GAP SERPL CALC-SCNC: 18 MMOL/L
APPEARANCE: CLEAR
BACTERIA UR CULT: ABNORMAL
BACTERIA: ABNORMAL /HPF
BILIRUBIN URINE: NEGATIVE
BLOOD URINE: NEGATIVE
BUN SERPL-MCNC: 27 MG/DL
CALCIUM SERPL-MCNC: 9.8 MG/DL
CAST: 0 /LPF
CHLORIDE SERPL-SCNC: 101 MMOL/L
CO2 SERPL-SCNC: 22 MMOL/L
COLOR: YELLOW
CREAT SERPL-MCNC: 1.03 MG/DL
EGFR: 73 ML/MIN/1.73M2
EPITHELIAL CELLS: 3 /HPF
GLUCOSE QUALITATIVE U: >=1000 MG/DL
KETONES URINE: NEGATIVE MG/DL
LEUKOCYTE ESTERASE URINE: ABNORMAL
MICROSCOPIC-UA: NORMAL
NITRITE URINE: NEGATIVE
PH URINE: 5.5
POTASSIUM SERPL-SCNC: 4.1 MMOL/L
PROTEIN URINE: NORMAL MG/DL
RED BLOOD CELLS URINE: 0 /HPF
SODIUM SERPL-SCNC: 141 MMOL/L
SPECIFIC GRAVITY URINE: 1.03
UROBILINOGEN URINE: 0.2 MG/DL
WHITE BLOOD CELLS URINE: 28 /HPF

## 2023-06-30 ENCOUNTER — LABORATORY RESULT (OUTPATIENT)
Age: 83
End: 2023-06-30

## 2023-07-05 DIAGNOSIS — N39.0 URINARY TRACT INFECTION, SITE NOT SPECIFIED: ICD-10-CM

## 2023-07-05 RX ORDER — LINEZOLID 600 MG/1
600 TABLET, FILM COATED ORAL
Qty: 20 | Refills: 0 | Status: ACTIVE | COMMUNITY
Start: 2023-07-05 | End: 1900-01-01

## 2023-10-04 ENCOUNTER — APPOINTMENT (OUTPATIENT)
Dept: UROLOGY | Facility: CLINIC | Age: 83
End: 2023-10-04
Payer: MEDICARE

## 2023-10-04 VITALS
SYSTOLIC BLOOD PRESSURE: 116 MMHG | HEIGHT: 71 IN | HEART RATE: 85 BPM | DIASTOLIC BLOOD PRESSURE: 68 MMHG | BODY MASS INDEX: 32.34 KG/M2 | WEIGHT: 231 LBS | RESPIRATION RATE: 17 BRPM

## 2023-10-04 DIAGNOSIS — N40.0 BENIGN PROSTATIC HYPERPLASIA WITHOUT LOWER URINARY TRACT SYMPMS: ICD-10-CM

## 2023-10-04 DIAGNOSIS — R97.20 ELEVATED PROSTATE, SPECIFIC ANTIGEN [PSA]: ICD-10-CM

## 2023-10-04 DIAGNOSIS — R33.9 RETENTION OF URINE, UNSPECIFIED: ICD-10-CM

## 2023-10-04 PROCEDURE — 99213 OFFICE O/P EST LOW 20 MIN: CPT

## 2023-10-04 PROCEDURE — 51798 US URINE CAPACITY MEASURE: CPT

## 2023-10-06 LAB
APPEARANCE: CLEAR
BACTERIA: ABNORMAL /HPF
BILIRUBIN URINE: NEGATIVE
BLOOD URINE: NEGATIVE
CAST: 2 /LPF
COLOR: YELLOW
EPITHELIAL CELLS: 4 /HPF
GLUCOSE QUALITATIVE U: >=1000 MG/DL
KETONES URINE: NEGATIVE MG/DL
LEUKOCYTE ESTERASE URINE: ABNORMAL
MICROSCOPIC-UA: NORMAL
NITRITE URINE: NEGATIVE
PH URINE: 5.5
PROTEIN URINE: NEGATIVE MG/DL
RED BLOOD CELLS URINE: 0 /HPF
SPECIFIC GRAVITY URINE: 1.03
UROBILINOGEN URINE: 0.2 MG/DL
WHITE BLOOD CELLS URINE: 44 /HPF

## 2023-10-08 LAB — BACTERIA UR CULT: ABNORMAL

## 2024-02-12 RX ORDER — FINASTERIDE 5 MG/1
5 TABLET, FILM COATED ORAL DAILY
Qty: 90 | Refills: 3 | Status: ACTIVE | COMMUNITY
Start: 2021-05-21 | End: 1900-01-01

## 2024-02-26 RX ORDER — DOXAZOSIN 4 MG/1
4 TABLET ORAL DAILY
Qty: 90 | Refills: 3 | Status: ACTIVE | COMMUNITY
Start: 2022-09-12 | End: 1900-01-01

## 2024-06-07 ENCOUNTER — NON-APPOINTMENT (OUTPATIENT)
Age: 84
End: 2024-06-07

## 2024-08-01 ENCOUNTER — APPOINTMENT (OUTPATIENT)
Dept: UROLOGY | Facility: CLINIC | Age: 84
End: 2024-08-01
Payer: MEDICARE

## 2024-08-01 VITALS
DIASTOLIC BLOOD PRESSURE: 72 MMHG | TEMPERATURE: 98 F | HEART RATE: 75 BPM | SYSTOLIC BLOOD PRESSURE: 118 MMHG | OXYGEN SATURATION: 97 %

## 2024-08-01 DIAGNOSIS — R33.9 RETENTION OF URINE, UNSPECIFIED: ICD-10-CM

## 2024-08-01 DIAGNOSIS — R21 RASH AND OTHER NONSPECIFIC SKIN ERUPTION: ICD-10-CM

## 2024-08-01 DIAGNOSIS — Z12.5 ENCOUNTER FOR SCREENING FOR MALIGNANT NEOPLASM OF PROSTATE: ICD-10-CM

## 2024-08-01 DIAGNOSIS — N40.0 BENIGN PROSTATIC HYPERPLASIA WITHOUT LOWER URINARY TRACT SYMPMS: ICD-10-CM

## 2024-08-01 PROCEDURE — 51798 US URINE CAPACITY MEASURE: CPT

## 2024-08-01 PROCEDURE — 99214 OFFICE O/P EST MOD 30 MIN: CPT

## 2024-08-01 RX ORDER — NYSTATIN AND TRIAMCINOLONE ACETONIDE 100000; 1 MG/G; MG/G
100000-0.1 CREAM TOPICAL TWICE DAILY
Qty: 1 | Refills: 1 | Status: ACTIVE | COMMUNITY
Start: 2024-08-01 | End: 1900-01-01

## 2024-08-01 NOTE — HISTORY OF PRESENT ILLNESS
[FreeTextEntry1] : Now 83 year M who presents for f/u regarding PSA, elevated PVR. Relatively mild sx at this time. Feels well, overall; no dysuria; more urge than prior. Some double voiding now, usually in AM. No hematuria.   Has retention with 440 cc PVR, and last time 243. Renal uS done 5/25/21 reviewed with pt- shows no hydro, large simple cyst on right, no stone or mass.  No urge to void at this time; voided prior. Notes new left groin rash over last few days u/a with 1000 glu,epith cells, and culture neg 7/18/24  PSA f/u showed  2.56-- 5/2023 free 39% 2.48--6/2022--free > 30% 3.79--10/2021 free > 30% 8.14 (free 36%)--> 5/21/21--restarted finasteride 7.38- 07/2020 1.51- 12/2019---> stopped finasteride 1.740- 12/2018 1.5- 02/2017 1.79- 07/2016 1.9- 1/2013 2.1- 4/2011 1.9- 4/2010   Pt had prostate biopsy around 2000- as per patient negative   no urge to void [None] : no symptoms

## 2024-08-01 NOTE — ASSESSMENT
[FreeTextEntry1] : Bladder scan today: 149 cc PVR- c/w prior, certainly no worse  Doing great, symptomatically, remains on finasteride left groin crease rash: nystatin/triamcinolone, though rec Dermatology if not improved  PSA free/total today- can review by phone nystatin/triamcinolone RTC 1 year with next psa eval cont finasteride

## 2024-08-01 NOTE — PHYSICAL EXAM
[General Appearance - Well Developed] : well developed [Abdomen Soft] : soft [Urinary Bladder Findings] : the bladder was normal on palpation [FreeTextEntry1] : left groin crease erythema with excoriation [Skin Color & Pigmentation] : normal skin color and pigmentation [Edema] : no peripheral edema [] : no respiratory distress [Oriented To Time, Place, And Person] : oriented to person, place, and time [Normal Station and Gait] : the gait and station were normal for the patient's age [No Focal Deficits] : no focal deficits

## 2024-08-02 LAB
PSA FREE FLD-MCNC: 33 %
PSA FREE SERPL-MCNC: 0.85 NG/ML
PSA SERPL-MCNC: 2.59 NG/ML

## 2024-11-25 ENCOUNTER — NON-APPOINTMENT (OUTPATIENT)
Age: 84
End: 2024-11-25

## 2024-11-25 ENCOUNTER — APPOINTMENT (OUTPATIENT)
Dept: CARDIOLOGY | Facility: CLINIC | Age: 84
End: 2024-11-25
Payer: MEDICARE

## 2024-11-25 VITALS
DIASTOLIC BLOOD PRESSURE: 60 MMHG | BODY MASS INDEX: 32.34 KG/M2 | OXYGEN SATURATION: 98 % | SYSTOLIC BLOOD PRESSURE: 118 MMHG | HEART RATE: 71 BPM | HEIGHT: 71 IN | WEIGHT: 231 LBS

## 2024-11-25 DIAGNOSIS — I10 ESSENTIAL (PRIMARY) HYPERTENSION: ICD-10-CM

## 2024-11-25 DIAGNOSIS — E78.5 HYPERLIPIDEMIA, UNSPECIFIED: ICD-10-CM

## 2024-11-25 PROCEDURE — G2211 COMPLEX E/M VISIT ADD ON: CPT

## 2024-11-25 PROCEDURE — 93000 ELECTROCARDIOGRAM COMPLETE: CPT

## 2024-11-25 PROCEDURE — 99204 OFFICE O/P NEW MOD 45 MIN: CPT

## 2025-02-04 ENCOUNTER — RX RENEWAL (OUTPATIENT)
Age: 85
End: 2025-02-04

## 2025-02-26 ENCOUNTER — RX RENEWAL (OUTPATIENT)
Age: 85
End: 2025-02-26

## 2025-05-12 ENCOUNTER — OUTPATIENT (OUTPATIENT)
Dept: OUTPATIENT SERVICES | Facility: HOSPITAL | Age: 85
LOS: 1 days | End: 2025-05-12
Payer: MEDICARE

## 2025-05-12 ENCOUNTER — APPOINTMENT (OUTPATIENT)
Dept: CT IMAGING | Facility: CLINIC | Age: 85
End: 2025-05-12
Payer: MEDICARE

## 2025-05-12 DIAGNOSIS — Z98.89 OTHER SPECIFIED POSTPROCEDURAL STATES: Chronic | ICD-10-CM

## 2025-05-12 DIAGNOSIS — R07.89 OTHER CHEST PAIN: ICD-10-CM

## 2025-05-12 PROCEDURE — 71275 CT ANGIOGRAPHY CHEST: CPT | Mod: 26

## 2025-05-12 PROCEDURE — 71275 CT ANGIOGRAPHY CHEST: CPT

## 2025-05-17 ENCOUNTER — NON-APPOINTMENT (OUTPATIENT)
Age: 85
End: 2025-05-17

## 2025-05-19 ENCOUNTER — APPOINTMENT (OUTPATIENT)
Dept: CARDIOLOGY | Facility: CLINIC | Age: 85
End: 2025-05-19
Payer: MEDICARE

## 2025-05-19 VITALS
WEIGHT: 220 LBS | OXYGEN SATURATION: 97 % | BODY MASS INDEX: 30.68 KG/M2 | HEART RATE: 68 BPM | SYSTOLIC BLOOD PRESSURE: 130 MMHG | DIASTOLIC BLOOD PRESSURE: 70 MMHG

## 2025-05-19 DIAGNOSIS — E11.9 TYPE 2 DIABETES MELLITUS W/OUT COMPLICATIONS: ICD-10-CM

## 2025-05-19 DIAGNOSIS — E78.5 HYPERLIPIDEMIA, UNSPECIFIED: ICD-10-CM

## 2025-05-19 DIAGNOSIS — I10 ESSENTIAL (PRIMARY) HYPERTENSION: ICD-10-CM

## 2025-05-19 PROCEDURE — 99214 OFFICE O/P EST MOD 30 MIN: CPT

## 2025-05-19 PROCEDURE — G2211 COMPLEX E/M VISIT ADD ON: CPT

## 2025-05-19 PROCEDURE — 93306 TTE W/DOPPLER COMPLETE: CPT

## 2025-07-29 LAB
PSA FREE FLD-MCNC: 31 %
PSA FREE SERPL-MCNC: 0.6 NG/ML
PSA SERPL-MCNC: 1.94 NG/ML

## 2025-08-07 ENCOUNTER — APPOINTMENT (OUTPATIENT)
Dept: UROLOGY | Facility: CLINIC | Age: 85
End: 2025-08-07
Payer: MEDICARE

## 2025-08-07 VITALS
OXYGEN SATURATION: 97 % | WEIGHT: 224 LBS | TEMPERATURE: 97.2 F | DIASTOLIC BLOOD PRESSURE: 69 MMHG | RESPIRATION RATE: 16 BRPM | HEIGHT: 71 IN | SYSTOLIC BLOOD PRESSURE: 135 MMHG | HEART RATE: 78 BPM | BODY MASS INDEX: 31.36 KG/M2

## 2025-08-07 DIAGNOSIS — R33.9 RETENTION OF URINE, UNSPECIFIED: ICD-10-CM

## 2025-08-07 DIAGNOSIS — Z12.5 ENCOUNTER FOR SCREENING FOR MALIGNANT NEOPLASM OF PROSTATE: ICD-10-CM

## 2025-08-07 DIAGNOSIS — N40.0 BENIGN PROSTATIC HYPERPLASIA WITHOUT LOWER URINARY TRACT SYMPMS: ICD-10-CM

## 2025-08-07 PROCEDURE — 99213 OFFICE O/P EST LOW 20 MIN: CPT

## 2025-09-21 ENCOUNTER — NON-APPOINTMENT (OUTPATIENT)
Age: 85
End: 2025-09-21

## (undated) DEVICE — MASK O2 NON REBREATH 3IN1 ADULT

## (undated) DEVICE — CATH IV SAFE BC 22G X 1" (BLUE)

## (undated) DEVICE — CANISTER SUCTION 1200CC 10/SL

## (undated) DEVICE — BITE BLOCK MAXI RUBBER STAMP

## (undated) DEVICE — FORCEP RADIAL JAW 4 W NDL 2.2MM 2.8MM 160CM YELLOW DISP

## (undated) DEVICE — DRSG CURITY GAUZE SPONGE 4 X 4" 12-PLY

## (undated) DEVICE — SENSOR O2 FINGER XL ADULT 24/BX 6BX/CA

## (undated) DEVICE — STERIS DEFENDO 3-PIECE KIT (AIR/WATER, SUCTION & BIOPSY VALVES)

## (undated) DEVICE — TUBING IV SET SECOND 34" W/O LOK-BLUNT

## (undated) DEVICE — FORMALIN CUPS 10% BUFFERED

## (undated) DEVICE — VALVE BIOPSY

## (undated) DEVICE — TUBE O2 SUPL CRUSH RESIS CONN SOUTHSIDE ONLY

## (undated) DEVICE — CATH ELECHMSTAT  INJ 7FR 210CM

## (undated) DEVICE — RADIAL JAW 4 LG CAPACITY WITH NDL

## (undated) DEVICE — TUBING IV SET GRAVITY 3Y 100" MACRO

## (undated) DEVICE — SOL IRR POUR H2O 500ML

## (undated) DEVICE — PACK IV START WITH CHG

## (undated) DEVICE — CATH IV SAFE BC 20G X 1.16" (PINK)

## (undated) DEVICE — SYR IV POSIFLUSH NS 3ML 30/TY

## (undated) DEVICE — SYR ALLIANCE II INFLATION 60ML

## (undated) DEVICE — TUBING SUCTION CONN 6FT STERILE

## (undated) DEVICE — Device

## (undated) DEVICE — TUBING CANNULA SALTER LABS NASAL ADULT 7FT

## (undated) DEVICE — BRUSH CYTO ENDO

## (undated) DEVICE — BRUSH COLONOSCOPY CYTOLOGY

## (undated) DEVICE — TUBING ENDO EXT OLYMPUS 160 24HR USE

## (undated) DEVICE — SUCTION YANKAUER TAPERED BULBOUS NO VENT